# Patient Record
Sex: FEMALE | Race: WHITE | NOT HISPANIC OR LATINO | Employment: UNEMPLOYED
[De-identification: names, ages, dates, MRNs, and addresses within clinical notes are randomized per-mention and may not be internally consistent; named-entity substitution may affect disease eponyms.]

---

## 2017-06-24 ENCOUNTER — HEALTH MAINTENANCE LETTER (OUTPATIENT)
Age: 57
End: 2017-06-24

## 2018-02-03 ENCOUNTER — HOSPITAL ENCOUNTER (EMERGENCY)
Facility: CLINIC | Age: 58
Discharge: HOME | End: 2018-02-03
Attending: INTERNAL MEDICINE | Admitting: INTERNAL MEDICINE
Payer: COMMERCIAL

## 2018-02-03 VITALS
SYSTOLIC BLOOD PRESSURE: 160 MMHG | RESPIRATION RATE: 18 BRPM | TEMPERATURE: 96.4 F | HEART RATE: 97 BPM | OXYGEN SATURATION: 97 % | WEIGHT: 131.44 LBS | DIASTOLIC BLOOD PRESSURE: 90 MMHG | BODY MASS INDEX: 21.21 KG/M2

## 2018-02-03 DIAGNOSIS — H66.001 ACUTE SUPPURATIVE OTITIS MEDIA OF RIGHT EAR WITHOUT SPONTANEOUS RUPTURE OF TYMPANIC MEMBRANE, RECURRENCE NOT SPECIFIED: ICD-10-CM

## 2018-02-03 PROCEDURE — 99284 EMERGENCY DEPT VISIT MOD MDM: CPT | Mod: GC | Performed by: INTERNAL MEDICINE

## 2018-02-03 PROCEDURE — 99282 EMERGENCY DEPT VISIT SF MDM: CPT | Performed by: INTERNAL MEDICINE

## 2018-02-03 RX ORDER — AMOXICILLIN 500 MG/1
1000 CAPSULE ORAL 2 TIMES DAILY
Qty: 40 CAPSULE | Refills: 0 | Status: SHIPPED | OUTPATIENT
Start: 2018-02-03 | End: 2018-02-13

## 2018-02-03 RX ORDER — PSEUDOEPHEDRINE HCL 30 MG
30 TABLET ORAL EVERY 4 HOURS PRN
Qty: 12 TABLET | Refills: 0 | Status: SHIPPED | OUTPATIENT
Start: 2018-02-03 | End: 2018-02-06

## 2018-02-03 ASSESSMENT — ENCOUNTER SYMPTOMS
SINUS PAIN: 1
NUMBNESS: 0
LIGHT-HEADEDNESS: 0
RHINORRHEA: 1
FEVER: 0
ABDOMINAL PAIN: 0
COUGH: 0
CHILLS: 0
SHORTNESS OF BREATH: 0
WEAKNESS: 0
HEADACHES: 0
BACK PAIN: 1
DIFFICULTY URINATING: 0
CONFUSION: 0

## 2018-02-03 NOTE — ED AVS SNAPSHOT
[facility], [place], Emergency Department    [address]    [facility] [address]    Phone:  [phone]    Fax:  [phone]                                       Linda Guadalupe   MRN: [id number]    Department:  [facility], Emergency Department   Date of Visit:  2/3/2018           After Visit Summary Signature Page     I have received my discharge instructions, and my questions have been answered. I have discussed any challenges I see with this plan with the nurse or doctor.    ..........................................................................................................................................  Patient/Patient Representative Signature      ..........................................................................................................................................  Patient Representative Print Name and Relationship to Patient    ..................................................               ................................................  Date                                            Time    ..........................................................................................................................................  Reviewed by Signature/Title    ...................................................              ..............................................  Date                                                            Time

## 2018-02-03 NOTE — ED AVS SNAPSHOT
[facility], Emergency Department    [address]    [facility] [place] [address]    Phone:  [phone]    Fax:  [phone]                                       Linda Guadalupe   MRN: [id number]    Department:  [facility], Emergency Department   Date of Visit:  2/3/2018           Patient Information     Date Of Birth          1960        Your diagnoses for this visit were:     Acute suppurative otitis media of right ear without spontaneous rupture of tympanic membrane, recurrence not specified        You were seen by Shawn Banegas MD.        Discharge Instructions         Please make an appointment to follow up with Your Primary Care Provider in 3-5 days unless symptoms completely resolve.    Middle Ear Infection (Adult)  You have an infection of the middle ear, the space behind the eardrum. This is also called acute otitis media (AOM). Sometimes it is caused by the common cold. This is because congestion can block the internal passage (eustachian tube) that drains fluid from the middle ear. When the middle ear fills with fluid, bacteria can grow there and cause an infection. Oral antibiotics are used to treat this illness, not ear drops. Symptoms usually start to improve within 1 to 2 days of treatment.    Home care  The following are general care guidelines:    Finish all of the antibiotic medicine given, even though you may feel better after the first few days.    You may use over-the-counter medicine, such as acetaminophen or ibuprofen, to control pain and fever, unless something else was prescribed. If you have chronic liver or kidney disease or have ever had a stomach ulcer or gastrointestinal bleeding, talk with your healthcare provider before using these medicines. Do not give aspirin to anyone under 18 years of age who has a fever. It may cause severe illness or death.  Follow-up care  Follow up with your healthcare provider, or as advised, in 2 weeks if all symptoms have not gotten  better, or if hearing doesn't go back to normal within 1 month.  When to seek medical advice  Call your healthcare provider right away if any of these occur:    Ear pain gets worse or does not improve after 3 days of treatment    Unusual drowsiness or confusion    Neck pain, stiff neck, or headache    Fluid or blood draining from the ear canal    Fever of 100.4 F (38 C) or as advised     Seizure  Date Last Reviewed: 6/1/2016 2000-2017 [employer]. [address] [place] [address]. All rights reserved. This information is not intended as a substitute for professional medical care. Always follow your healthcare professional's instructions.          24 Hour Appointment Hotline       To make an appointment at any [facility] (formerly [facility]), call [phone] ([phone]). If you don't have a family doctor or clinic, we will help you find one. [facility] [place] clinics are conveniently located to serve the needs of you and your family.             Review of your medicines      START taking        Dose / Directions Last dose taken    amoxicillin 500 MG capsule   Commonly known as:  AMOXIL   Dose:  1000 mg   Quantity:  40 capsule        Take 2 capsules (1,000 mg) by mouth 2 times daily for 10 days   Refills:  0        pseudoePHEDrine 30 MG tablet   Commonly known as:  SUDAFED   Dose:  30 mg   Quantity:  12 tablet        Take 1 tablet (30 mg) by mouth every 4 hours as needed for congestion   Refills:  0          Our records show that you are taking the medicines listed below. If these are incorrect, please call your family doctor or clinic.        Dose / Directions Last dose taken    gabapentin 300 MG capsule   Commonly known as:  NEURONTIN   Quantity:  90 capsule        Take 1 tablet (300 mg) tonight , then 1 tablet twice daily for  3 days, then 1 tablet three times daily   Refills:  1        ibuprofen 800 MG tablet   Commonly known as:  ADVIL/MOTRIN   Dose:  800 mg   Quantity:  90 tablet        Take 1 tablet  (800 mg) by mouth every 8 hours as needed for pain   Refills:  1        oxyCODONE-acetaminophen 5-325 MG per tablet   Commonly known as:  PERCOCET   Dose:  1-2 tablet   Quantity:  16 tablet        Take 1-2 tablets by mouth every 6 hours as needed for moderate to severe pain   Refills:  0                Prescriptions were sent or printed at these locations (2 Prescriptions)                   Other Prescriptions                Printed at Department/Unit printer (2 of 2)         amoxicillin (AMOXIL) 500 MG capsule               pseudoePHEDrine (SUDAFED) 30 MG tablet                Orders Needing Specimen Collection     None      Pending Results     No orders found from 2/1/2018 to 2/4/2018.            Pending Culture Results     No orders found from 2/1/2018 to 2/4/2018.            Pending Results Instructions     If you had any lab results that were not finalized at the time of your Discharge, you can call the ED Lab Result RN at [phone]. You will be contacted by this team for any positive Lab results or changes in treatment. The nurses are available 7 days a week from 10A to 6:30P.  You can leave a message 24 hours per day and they will return your call.        Thank you for choosing [facility]       Thank you for choosing [facility] for your care. Our goal is always to provide you with excellent care. Hearing back from our patients is one way we can continue to improve our services. Please take a few minutes to complete the written survey that you may receive in the mail after you visit with us. Thank you!        [employer] Information     [employer] gives you secure access to your electronic health record. If you see a primary care provider, you can also send messages to your care team and make appointments. If you have questions, please call your primary care clinic.  If you do not have a primary care provider, please call [phone] and they will assist you.        Care EveryWhere ID     This is your Care EveryWhere  ID. This could be used by other organizations to access your [facility] medical records  [id number]        Equal Access to Services     TATIANA GERMAN : La Nena Kelley, hair [place]field, maicol dennis. [facility] [phone].    ATENCIÓN: Si habla español, tiene a walden disposición servicios gratuitos de asistencia lingüística. Llame al [phone].    We comply with applicable federal civil rights laws and [place] laws. We do not discriminate on the basis of race, color, national origin, age, disability, sex, sexual orientation, or gender identity.            After Visit Summary       This is your record. Keep this with you and show to your community pharmacist(s) and doctor(s) at your next visit.

## 2018-02-04 NOTE — ED PROVIDER NOTES
History     Chief Complaint   Patient presents with     Otalgia     Has pain and ringing in right ear.  Symptoms for 3 days. Has URI symptoms of cough, runny nose, sinus pain.for similar length of time.     BENNY Guadalupe is a 57 year old female who presents with chief complaint of right ear pain.  Patient reports having cold symptoms such as cough, runny nose and sinus congestion starting about 3-4 days ago.  For about 2-3 days patient has been having throbbing right ear pain causing difficulty with sleep.  Patient denies fevers, chills, shortness of breath.  She has not had any drainage outside of her ear.  Patient is a smoker.  She reports taking over-the-counter Tylenol and ibuprofen with minimal relief.    I have reviewed the Medications, Allergies, Past Medical and Surgical History, and Social History in the Epic system.    Review of Systems   Constitutional: Negative for chills and fever.   HENT: Positive for congestion, ear pain, rhinorrhea, sinus pain and tinnitus. Negative for ear discharge.    Respiratory: Negative for cough and shortness of breath.    Cardiovascular: Negative for chest pain.   Gastrointestinal: Negative for abdominal pain.   Genitourinary: Negative for difficulty urinating.   Musculoskeletal: Positive for back pain.   Neurological: Negative for weakness, light-headedness, numbness and headaches.   Psychiatric/Behavioral: Negative for confusion.       Physical Exam   BP: 160/90  Pulse: 97  Temp: 96.4  F (35.8  C)  Resp: 18  Weight: 59.6 kg (131 lb 7 oz)  SpO2: 97 %      Physical Exam   Constitutional: She is oriented to person, place, and time. No distress.   HENT:   Head: Atraumatic.   Right Ear: External ear and ear canal normal. Tympanic membrane is erythematous. Tympanic membrane is not injected and not bulging. No middle ear effusion. Decreased hearing is noted.   Left Ear: Tympanic membrane normal.   Ears:    Mouth/Throat: Oropharynx is clear and moist.   Eyes: Pupils are  equal, round, and reactive to light. No scleral icterus.   Neck: Neck supple.   Cardiovascular: Normal rate, regular rhythm, normal heart sounds and intact distal pulses.    No murmur heard.  Pulmonary/Chest: Effort normal and breath sounds normal. No respiratory distress. She has no wheezes. She has no rales.   Coarse breath sounds   Abdominal: Soft. Bowel sounds are normal. There is no tenderness.   Musculoskeletal: She exhibits no edema or tenderness.   Lymphadenopathy:     She has no cervical adenopathy.   Neurological: She is alert and oriented to person, place, and time. No cranial nerve deficit.   Skin: Skin is warm. No rash noted. She is not diaphoretic.   Psychiatric: She has a normal mood and affect. Her behavior is normal.   Vitals reviewed.      ED Course     ED Course     Procedures            Critical Care time:             Labs Ordered and Resulted from Time of ED Arrival Up to the Time of Departure from the ED - No data to display         Assessments & Plan (with Medical Decision Making)   Patient is a 57-year-old female who presents with chief complaint of right ear pain for 2-3 days duration.  Patient also has accompanying tinnitus.  Physical exam shows a bulging and erythematous tympanic membrane on the right side, the left ear being normal.  Will treat patient with amoxicillin and pseudoephedrine for decongestion.  Recommend patient follow-up with her PCP in 5 days if she is not starting to improve.    I have reviewed the nursing notes.    I have reviewed the findings, diagnosis, plan and need for follow up with the patient.  This data collected with the Resident working in the Emergency Department.  Patient was seen and evaluated by myself and I repeated the history and physical exam with the patient.  The plan of care was discussed with them.  The key portions of the note including the entire assessment and plan reflect my documentation.--Maliatakaley        Discharge Medication List as of  2/3/2018  8:32 PM      START taking these medications    Details   amoxicillin (AMOXIL) 500 MG capsule Take 2 capsules (1,000 mg) by mouth 2 times daily for 10 days, Disp-40 capsule, R-0, Local Print      pseudoePHEDrine (SUDAFED) 30 MG tablet Take 1 tablet (30 mg) by mouth every 4 hours as needed for congestion, Disp-12 tablet, R-0, Local Print             Final diagnoses:   Acute suppurative otitis media of right ear without spontaneous rupture of tympanic membrane, recurrence not specified     Jac Nickerson D.O.  Family Medicine   [phone]  2/3/2018   [facility], [place], EMERGENCY DEPARTMENT     Shawn Banegas MD  02/03/18 1307

## 2018-02-04 NOTE — DISCHARGE INSTRUCTIONS
Please make an appointment to follow up with Your Primary Care Provider in 3-5 days unless symptoms completely resolve.    Middle Ear Infection (Adult)  You have an infection of the middle ear, the space behind the eardrum. This is also called acute otitis media (AOM). Sometimes it is caused by the common cold. This is because congestion can block the internal passage (eustachian tube) that drains fluid from the middle ear. When the middle ear fills with fluid, bacteria can grow there and cause an infection. Oral antibiotics are used to treat this illness, not ear drops. Symptoms usually start to improve within 1 to 2 days of treatment.    Home care  The following are general care guidelines:    Finish all of the antibiotic medicine given, even though you may feel better after the first few days.    You may use over-the-counter medicine, such as acetaminophen or ibuprofen, to control pain and fever, unless something else was prescribed. If you have chronic liver or kidney disease or have ever had a stomach ulcer or gastrointestinal bleeding, talk with your healthcare provider before using these medicines. Do not give aspirin to anyone under 18 years of age who has a fever. It may cause severe illness or death.  Follow-up care  Follow up with your healthcare provider, or as advised, in 2 weeks if all symptoms have not gotten better, or if hearing doesn't go back to normal within 1 month.  When to seek medical advice  Call your healthcare provider right away if any of these occur:    Ear pain gets worse or does not improve after 3 days of treatment    Unusual drowsiness or confusion    Neck pain, stiff neck, or headache    Fluid or blood draining from the ear canal    Fever of 100.4 F (38 C) or as advised     Seizure  Date Last Reviewed: 6/1/2016 2000-2017 [employer]. [address] [place] [address]. All rights reserved. This information is not intended as a substitute for professional  medical care. Always follow your healthcare professional's instructions.

## 2018-05-07 ENCOUNTER — MEDICAL CORRESPONDENCE (OUTPATIENT)
Dept: HEALTH INFORMATION MANAGEMENT | Facility: CLINIC | Age: 58
End: 2018-05-07

## 2018-09-04 ENCOUNTER — RECORDS - HEALTHEAST (OUTPATIENT)
Dept: ADMINISTRATIVE | Facility: OTHER | Age: 58
End: 2018-09-04

## 2018-09-17 ENCOUNTER — RECORDS - HEALTHEAST (OUTPATIENT)
Dept: ADMINISTRATIVE | Facility: OTHER | Age: 58
End: 2018-09-17

## 2018-10-01 ENCOUNTER — RECORDS - HEALTHEAST (OUTPATIENT)
Dept: ADMINISTRATIVE | Facility: OTHER | Age: 58
End: 2018-10-01

## 2018-10-29 ENCOUNTER — RECORDS - HEALTHEAST (OUTPATIENT)
Dept: ADMINISTRATIVE | Facility: OTHER | Age: 58
End: 2018-10-29

## 2018-12-10 ENCOUNTER — RECORDS - HEALTHEAST (OUTPATIENT)
Dept: ADMINISTRATIVE | Facility: OTHER | Age: 58
End: 2018-12-10

## 2019-10-02 ENCOUNTER — HEALTH MAINTENANCE LETTER (OUTPATIENT)
Age: 59
End: 2019-10-02

## 2020-02-24 ENCOUNTER — RECORDS - HEALTHEAST (OUTPATIENT)
Dept: ADMINISTRATIVE | Facility: OTHER | Age: 60
End: 2020-02-24

## 2021-01-15 ENCOUNTER — HEALTH MAINTENANCE LETTER (OUTPATIENT)
Age: 61
End: 2021-01-15

## 2021-09-04 ENCOUNTER — HEALTH MAINTENANCE LETTER (OUTPATIENT)
Age: 61
End: 2021-09-04

## 2021-10-30 ENCOUNTER — HEALTH MAINTENANCE LETTER (OUTPATIENT)
Age: 61
End: 2021-10-30

## 2022-02-19 ENCOUNTER — HEALTH MAINTENANCE LETTER (OUTPATIENT)
Age: 62
End: 2022-02-19

## 2022-10-22 ENCOUNTER — HEALTH MAINTENANCE LETTER (OUTPATIENT)
Age: 62
End: 2022-10-22

## 2023-01-20 ENCOUNTER — APPOINTMENT (OUTPATIENT)
Dept: GENERAL RADIOLOGY | Facility: CLINIC | Age: 63
End: 2023-01-20
Attending: EMERGENCY MEDICINE
Payer: COMMERCIAL

## 2023-01-20 ENCOUNTER — HOSPITAL ENCOUNTER (INPATIENT)
Facility: CLINIC | Age: 63
LOS: 4 days | Discharge: HOME | End: 2023-01-24
Attending: EMERGENCY MEDICINE | Admitting: INTERNAL MEDICINE
Payer: COMMERCIAL

## 2023-01-20 DIAGNOSIS — I16.0 HYPERTENSIVE URGENCY: ICD-10-CM

## 2023-01-20 DIAGNOSIS — I21.4 NSTEMI (NON-ST ELEVATED MYOCARDIAL INFARCTION) (H): Primary | ICD-10-CM

## 2023-01-20 PROBLEM — G62.9 NEUROPATHY: Status: ACTIVE | Noted: 2023-01-20

## 2023-01-20 LAB
ALBUMIN SERPL BCG-MCNC: 5.2 G/DL (ref 3.5–5.2)
ALP SERPL-CCNC: 88 U/L (ref 35–104)
ALT SERPL W P-5'-P-CCNC: 22 U/L (ref 10–35)
ANION GAP SERPL CALCULATED.3IONS-SCNC: 13 MMOL/L (ref 7–15)
AST SERPL W P-5'-P-CCNC: 32 U/L (ref 10–35)
ATRIAL RATE - MUSE: 127 BPM
BASOPHILS # BLD AUTO: 0.1 10E3/UL (ref 0–0.2)
BASOPHILS NFR BLD AUTO: 1 %
BILIRUB SERPL-MCNC: 0.6 MG/DL
BUN SERPL-MCNC: 8.3 MG/DL (ref 8–23)
CALCIUM SERPL-MCNC: 9.9 MG/DL (ref 8.8–10.2)
CHLORIDE SERPL-SCNC: 99 MMOL/L (ref 98–107)
CREAT SERPL-MCNC: 0.81 MG/DL (ref 0.51–0.95)
D DIMER PPP FEU-MCNC: <0.27 UG/ML FEU (ref 0–0.5)
DEPRECATED HCO3 PLAS-SCNC: 24 MMOL/L (ref 22–29)
DIASTOLIC BLOOD PRESSURE - MUSE: NORMAL MMHG
EOSINOPHIL # BLD AUTO: 0.1 10E3/UL (ref 0–0.7)
EOSINOPHIL NFR BLD AUTO: 2 %
ERYTHROCYTE [DISTWIDTH] IN BLOOD BY AUTOMATED COUNT: 12.4 % (ref 10–15)
ETHANOL SERPL-MCNC: <0.01 G/DL
GFR SERPL CREATININE-BSD FRML MDRD: 83 ML/MIN/1.73M2
GLUCOSE SERPL-MCNC: 171 MG/DL (ref 70–99)
HCT VFR BLD AUTO: 47.2 % (ref 35–47)
HGB BLD-MCNC: 16 G/DL (ref 11.7–15.7)
IMM GRANULOCYTES # BLD: 0 10E3/UL
IMM GRANULOCYTES NFR BLD: 0 %
INTERPRETATION ECG - MUSE: NORMAL
LYMPHOCYTES # BLD AUTO: 2.2 10E3/UL (ref 0.8–5.3)
LYMPHOCYTES NFR BLD AUTO: 25 %
MCH RBC QN AUTO: 31.6 PG (ref 26.5–33)
MCHC RBC AUTO-ENTMCNC: 33.9 G/DL (ref 31.5–36.5)
MCV RBC AUTO: 93 FL (ref 78–100)
MONOCYTES # BLD AUTO: 0.6 10E3/UL (ref 0–1.3)
MONOCYTES NFR BLD AUTO: 7 %
NEUTROPHILS # BLD AUTO: 5.5 10E3/UL (ref 1.6–8.3)
NEUTROPHILS NFR BLD AUTO: 65 %
NRBC # BLD AUTO: 0 10E3/UL
NRBC BLD AUTO-RTO: 0 /100
P AXIS - MUSE: 86 DEGREES
PLATELET # BLD AUTO: 396 10E3/UL (ref 150–450)
POTASSIUM SERPL-SCNC: 4.7 MMOL/L (ref 3.4–5.3)
PR INTERVAL - MUSE: 152 MS
PROT SERPL-MCNC: 7.8 G/DL (ref 6.4–8.3)
QRS DURATION - MUSE: 94 MS
QT - MUSE: 310 MS
QTC - MUSE: 450 MS
R AXIS - MUSE: -65 DEGREES
RBC # BLD AUTO: 5.06 10E6/UL (ref 3.8–5.2)
SARS-COV-2 RNA RESP QL NAA+PROBE: NEGATIVE
SODIUM SERPL-SCNC: 136 MMOL/L (ref 136–145)
SYSTOLIC BLOOD PRESSURE - MUSE: NORMAL MMHG
T AXIS - MUSE: 63 DEGREES
TROPONIN T SERPL HS-MCNC: 10 NG/L
TROPONIN T SERPL HS-MCNC: 105 NG/L
TROPONIN T SERPL HS-MCNC: 114 NG/L
TROPONIN T SERPL HS-MCNC: 80 NG/L
UFH PPP CHRO-ACNC: 0.1 IU/ML
VENTRICULAR RATE- MUSE: 127 BPM
WBC # BLD AUTO: 8.6 10E3/UL (ref 4–11)

## 2023-01-20 PROCEDURE — U0003 INFECTIOUS AGENT DETECTION BY NUCLEIC ACID (DNA OR RNA); SEVERE ACUTE RESPIRATORY SYNDROME CORONAVIRUS 2 (SARS-COV-2) (CORONAVIRUS DISEASE [COVID-19]), AMPLIFIED PROBE TECHNIQUE, MAKING USE OF HIGH THROUGHPUT TECHNOLOGIES AS DESCRIBED BY CMS-2020-01-R: HCPCS | Performed by: INTERNAL MEDICINE

## 2023-01-20 PROCEDURE — 80053 COMPREHEN METABOLIC PANEL: CPT | Performed by: EMERGENCY MEDICINE

## 2023-01-20 PROCEDURE — 250N000013 HC RX MED GY IP 250 OP 250 PS 637: Performed by: INTERNAL MEDICINE

## 2023-01-20 PROCEDURE — 80061 LIPID PANEL: CPT | Performed by: INTERNAL MEDICINE

## 2023-01-20 PROCEDURE — C9803 HOPD COVID-19 SPEC COLLECT: HCPCS

## 2023-01-20 PROCEDURE — 84484 ASSAY OF TROPONIN QUANT: CPT | Performed by: INTERNAL MEDICINE

## 2023-01-20 PROCEDURE — 84484 ASSAY OF TROPONIN QUANT: CPT | Performed by: EMERGENCY MEDICINE

## 2023-01-20 PROCEDURE — 210N000002 HC R&B HEART CARE

## 2023-01-20 PROCEDURE — 83036 HEMOGLOBIN GLYCOSYLATED A1C: CPT | Performed by: INTERNAL MEDICINE

## 2023-01-20 PROCEDURE — 250N000013 HC RX MED GY IP 250 OP 250 PS 637: Performed by: PHYSICIAN ASSISTANT

## 2023-01-20 PROCEDURE — 250N000011 HC RX IP 250 OP 636: Performed by: EMERGENCY MEDICINE

## 2023-01-20 PROCEDURE — 250N000009 HC RX 250: Performed by: INTERNAL MEDICINE

## 2023-01-20 PROCEDURE — 99223 1ST HOSP IP/OBS HIGH 75: CPT | Mod: AI | Performed by: INTERNAL MEDICINE

## 2023-01-20 PROCEDURE — 99285 EMERGENCY DEPT VISIT HI MDM: CPT | Mod: 25

## 2023-01-20 PROCEDURE — 36415 COLL VENOUS BLD VENIPUNCTURE: CPT | Performed by: EMERGENCY MEDICINE

## 2023-01-20 PROCEDURE — 82077 ASSAY SPEC XCP UR&BREATH IA: CPT | Performed by: EMERGENCY MEDICINE

## 2023-01-20 PROCEDURE — 250N000013 HC RX MED GY IP 250 OP 250 PS 637: Performed by: EMERGENCY MEDICINE

## 2023-01-20 PROCEDURE — 93005 ELECTROCARDIOGRAM TRACING: CPT

## 2023-01-20 PROCEDURE — 99223 1ST HOSP IP/OBS HIGH 75: CPT | Performed by: INTERNAL MEDICINE

## 2023-01-20 PROCEDURE — 85520 HEPARIN ASSAY: CPT | Performed by: EMERGENCY MEDICINE

## 2023-01-20 PROCEDURE — 85379 FIBRIN DEGRADATION QUANT: CPT | Performed by: EMERGENCY MEDICINE

## 2023-01-20 PROCEDURE — 36415 COLL VENOUS BLD VENIPUNCTURE: CPT | Performed by: INTERNAL MEDICINE

## 2023-01-20 PROCEDURE — 85025 COMPLETE CBC W/AUTO DIFF WBC: CPT | Performed by: EMERGENCY MEDICINE

## 2023-01-20 PROCEDURE — 71046 X-RAY EXAM CHEST 2 VIEWS: CPT

## 2023-01-20 RX ORDER — ALBUTEROL SULFATE 90 UG/1
1-2 AEROSOL, METERED RESPIRATORY (INHALATION) EVERY 4 HOURS PRN
COMMUNITY

## 2023-01-20 RX ORDER — OXYCODONE AND ACETAMINOPHEN 5; 325 MG/1; MG/1
1 TABLET ORAL EVERY 6 HOURS PRN
Status: DISCONTINUED | OUTPATIENT
Start: 2023-01-20 | End: 2023-01-21

## 2023-01-20 RX ORDER — ACETAMINOPHEN 325 MG/1
650 TABLET ORAL EVERY 6 HOURS PRN
Status: DISCONTINUED | OUTPATIENT
Start: 2023-01-20 | End: 2023-01-24 | Stop reason: HOSPADM

## 2023-01-20 RX ORDER — NALOXONE HYDROCHLORIDE 0.4 MG/ML
0.4 INJECTION, SOLUTION INTRAMUSCULAR; INTRAVENOUS; SUBCUTANEOUS
Status: DISCONTINUED | OUTPATIENT
Start: 2023-01-20 | End: 2023-01-24 | Stop reason: HOSPADM

## 2023-01-20 RX ORDER — ASPIRIN 81 MG/1
162 TABLET, CHEWABLE ORAL ONCE
Status: DISCONTINUED | OUTPATIENT
Start: 2023-01-20 | End: 2023-01-20

## 2023-01-20 RX ORDER — HEPARIN SODIUM 10000 [USP'U]/100ML
0-5000 INJECTION, SOLUTION INTRAVENOUS CONTINUOUS
Status: DISCONTINUED | OUTPATIENT
Start: 2023-01-20 | End: 2023-01-23

## 2023-01-20 RX ORDER — NITROGLYCERIN 0.4 MG/1
0.4 TABLET SUBLINGUAL EVERY 5 MIN PRN
Status: DISCONTINUED | OUTPATIENT
Start: 2023-01-20 | End: 2023-01-23

## 2023-01-20 RX ORDER — METOPROLOL TARTRATE 1 MG/ML
5 INJECTION, SOLUTION INTRAVENOUS ONCE
Status: COMPLETED | OUTPATIENT
Start: 2023-01-20 | End: 2023-01-20

## 2023-01-20 RX ORDER — METOPROLOL TARTRATE 25 MG/1
25 TABLET, FILM COATED ORAL 2 TIMES DAILY
Status: DISCONTINUED | OUTPATIENT
Start: 2023-01-20 | End: 2023-01-22

## 2023-01-20 RX ORDER — NALOXONE HYDROCHLORIDE 0.4 MG/ML
0.2 INJECTION, SOLUTION INTRAMUSCULAR; INTRAVENOUS; SUBCUTANEOUS
Status: DISCONTINUED | OUTPATIENT
Start: 2023-01-20 | End: 2023-01-24 | Stop reason: HOSPADM

## 2023-01-20 RX ORDER — MORPHINE SULFATE 2 MG/ML
2 INJECTION, SOLUTION INTRAMUSCULAR; INTRAVENOUS
Status: DISCONTINUED | OUTPATIENT
Start: 2023-01-20 | End: 2023-01-21

## 2023-01-20 RX ORDER — MAGNESIUM HYDROXIDE/ALUMINUM HYDROXICE/SIMETHICONE 120; 1200; 1200 MG/30ML; MG/30ML; MG/30ML
30 SUSPENSION ORAL EVERY 4 HOURS PRN
Status: DISCONTINUED | OUTPATIENT
Start: 2023-01-20 | End: 2023-01-24 | Stop reason: HOSPADM

## 2023-01-20 RX ORDER — ALBUTEROL SULFATE 90 UG/1
1-2 AEROSOL, METERED RESPIRATORY (INHALATION) EVERY 4 HOURS PRN
Status: DISCONTINUED | OUTPATIENT
Start: 2023-01-20 | End: 2023-01-24 | Stop reason: HOSPADM

## 2023-01-20 RX ORDER — ACETAMINOPHEN 650 MG/1
650 SUPPOSITORY RECTAL EVERY 6 HOURS PRN
Status: DISCONTINUED | OUTPATIENT
Start: 2023-01-20 | End: 2023-01-24 | Stop reason: HOSPADM

## 2023-01-20 RX ORDER — ASPIRIN 81 MG/1
324 TABLET, CHEWABLE ORAL ONCE
Status: COMPLETED | OUTPATIENT
Start: 2023-01-20 | End: 2023-01-20

## 2023-01-20 RX ORDER — LORAZEPAM 1 MG/1
1 TABLET ORAL ONCE
Status: COMPLETED | OUTPATIENT
Start: 2023-01-20 | End: 2023-01-20

## 2023-01-20 RX ORDER — MORPHINE SULFATE 2 MG/ML
1 INJECTION, SOLUTION INTRAMUSCULAR; INTRAVENOUS
Status: DISCONTINUED | OUTPATIENT
Start: 2023-01-20 | End: 2023-01-21

## 2023-01-20 RX ORDER — ASPIRIN 81 MG/1
81 TABLET ORAL DAILY
Status: DISCONTINUED | OUTPATIENT
Start: 2023-01-21 | End: 2023-01-24 | Stop reason: HOSPADM

## 2023-01-20 RX ORDER — LIDOCAINE 40 MG/G
CREAM TOPICAL
Status: DISCONTINUED | OUTPATIENT
Start: 2023-01-20 | End: 2023-01-24 | Stop reason: HOSPADM

## 2023-01-20 RX ADMIN — MELATONIN 5 MG TABLET 5 MG: at 22:29

## 2023-01-20 RX ADMIN — HEPARIN SODIUM 600 UNITS/HR: 10000 INJECTION, SOLUTION INTRAVENOUS at 15:57

## 2023-01-20 RX ADMIN — LORAZEPAM 1 MG: 1 TABLET ORAL at 10:52

## 2023-01-20 RX ADMIN — METOPROLOL TARTRATE 25 MG: 25 TABLET, FILM COATED ORAL at 22:28

## 2023-01-20 RX ADMIN — METOPROLOL TARTRATE 5 MG: 5 INJECTION INTRAVENOUS at 15:49

## 2023-01-20 RX ADMIN — OXYCODONE HYDROCHLORIDE AND ACETAMINOPHEN 1 TABLET: 5; 325 TABLET ORAL at 18:31

## 2023-01-20 RX ADMIN — ASPIRIN 81 MG CHEWABLE TABLET 324 MG: 81 TABLET CHEWABLE at 15:49

## 2023-01-20 ASSESSMENT — ACTIVITIES OF DAILY LIVING (ADL)
ADLS_ACUITY_SCORE: 35

## 2023-01-20 NOTE — H&P
"[facility]    History and Physical - Hospitalist Service       Date of Admission:  1/20/2023    Assessment & Plan      Linda Guadalupe is a 62 year old female with history of tobacco use (smokes half pack of cigarettes per day) who presents with uneasy feeling, also an episode of chest pain.    Principal Problem:    NSTEMI (non-ST elevated myocardial infarction) (H)    Admit as inpatient    Given aspirin in ED; treat with low intensity heparin drip    Repeat troponin T in 2 hours x 2.    Cardiology consult requested, I discussed with Dr. Garcia and appreciate her evaluation recommendations    NPO at midnight, per Cardiology    Treat tachycardia and hypertension, see below    Check lipid profile  Active Problems:    Hypertensive urgency    Treated with IV beta-blocker    Tobacco use disorder    Smoking cessation counseling     Diet:   Regular, no caffeine  DVT Prophylaxis: Heparin   Coles Catheter: Not present  Lines: None     Cardiac Monitoring: None  Code Status:  Full    Clinically Significant Risk Factors Present on Admission                             Disposition Plan      Expected Discharge Date: 01/22/2023                  Vanessa Herrera MD  Hospitalist Service  [facility]  Securely message with [employer] (more info)  Text page via [employer] Paging/Directory     ______________________________________________________________________    Chief Complaint   \"I felt 'off\" all last week.\"      History of Present Illness   Linda Guadalupe is a 62 year old female with history of tobacco abuse, smokes half pack of cigarettes per day, who presents to the emergency department reporting tremor of both hands, insomnia, and an episode of chest discomfort.    Linda says she felt \"off\" all of last week.  She said she had a tremor involving the left hand, found it difficult to apply her mascara because her hands were shaking.  She says she felt as if a \"spirit\" or maybe \"an tommy\" " "was hovering beside her all week \"helping me do my work, guiding me.\"  She slept very little last night.    This morning, she had an episode of central chest tightness which she rates 10 out of 10 in intensity.  She leaned against the counter at home and the pain resolved spontaneously.  Pain did not radiate to the neck, back, arm or jaw. She did not perspire, was not nauseated, did not vomit.  She asked a family member to bring her to the emergency department for evaluation.    In the triage area, she reported feeling panicked, as if she could not breathe.  She reported feeling anxious and overwhelmed.  She was treated with Ativan 1 mg IV and overall feels much better.  She has no chest discomfort at this time.  The first troponin value was normal, second was elevated.  Initial EKG was a poor quality study due to significant artifact.  EKG was repeated, this shows normal sinus rhythm with tall, upright R waves in the anterior precordial leads, left anterior fascicular block and ST segment changes in V1 through V3.  Patient is admitted for further evaluation treatment of non-STEMI.      Past Medical History    Past Medical History:   Diagnosis Date     Tobacco abuse 11/13/2012       Past Surgical History   Past Surgical History:   Procedure Laterality Date     ORTHOPEDIC SURGERY  1996    R shoulder       Prior to Admission Medications   Prior to Admission Medications   Prescriptions Last Dose Informant Patient Reported? Taking?   albuterol (PROAIR HFA/PROVENTIL HFA/VENTOLIN HFA) 108 (90 Base) MCG/ACT inhaler  at prn  Yes Yes   Sig: Inhale 1-2 puffs into the lungs every 4 hours as needed for shortness of breath or wheezing   ibuprofen (ADVIL,MOTRIN) 800 MG tablet  at prn  No Yes   Sig: Take 1 tablet (800 mg) by mouth every 8 hours as needed for pain   Patient taking differently: Take 600 mg by mouth every 8 hours as needed for pain   oxyCODONE-acetaminophen (PERCOCET) 5-325 MG per tablet  at prn  No Yes   Sig: Take " 1-2 tablets by mouth every 6 hours as needed for moderate to severe pain      Facility-Administered Medications: None        Family History   I have reviewed this patient's family history and updated it with pertinent information if needed.   Father  at age 49 of heart disease.    Physical Exam   Vital Signs: Temp: 97.9  F (36.6  C) Temp src: Oral BP: (!) 149/101 Pulse: 110   Resp: 27 SpO2: 98 % O2 Device: None (Room air)    Weight: 110 lbs 9.6 oz    General Appearance: Awake, alert, looks comfortable  Respiratory: Lungs are clear to auscultation  Cardiovascular: Heart is tachycardic, regular rate and rhythm  GI: Abdomen is soft, nontender, there are bowel sounds present  Skin: No rash on exposed skin  Other: Mood is very pleasant    Medical Decision Making       75 MINUTES SPENT BY ME on the date of service doing chart review, history, exam, documentation & further activities per the note.      Data     I have personally reviewed the following data over the past 24 hrs:    8.6  \   16.0 (H)   / 396     136 99 8.3 /  171 (H)   4.7 24 0.81 \       ALT: 22 AST: 32 AP: 88 TBILI: 0.6   ALB: 5.2 TOT PROTEIN: 7.8 LIPASE: N/A       Trop: 80 (H) BNP: N/A       INR:  N/A PTT:  N/A   D-dimer:  <0.27 Fibrinogen:  N/A       Imaging results reviewed over the past 24 hrs:   Recent Results (from the past 24 hour(s))   XR Chest 2 Views    Narrative    CHEST TWO VIEWS 2023 11:02 AM     HISTORY: Shortness of breath.    COMPARISON: May 11, 2016       Impression    IMPRESSION: There are no acute infiltrates. The cardiac silhouette is  not enlarged. Pulmonary vasculature is unremarkable.    CLOTILDE LOO MD         SYSTEM ID:  [id number]

## 2023-01-20 NOTE — Clinical Note
Stent deployed in the proximal left anterior descending. Max pressure = 11 jaguar. Total duration = 25 seconds.

## 2023-01-20 NOTE — CONSULTS
[facility]    Cardiology Consultation     Date of Admission:  1/20/2023    Assessment & Plan   Linda Guadalupe is a 62 year old female who was admitted on 1/20/2023.    1. NSTEMI. HS trop T 10 -->80  2. Severe hypertension  3. Tobacco use disorder, considering quitting.   4. Erythrocytosis, suspect due to smoking  5. Chronic back pain    Patient being treated presumptively for non-STEMI but mildly elevated troponin may be due to severe hypertension.  She is currently receiving heparin drip and has also received aspirin, IV metoprolol and lorazepam with improvement in blood pressure.      Metoprolol tartrate 25 mg p.o. twice daily    Lipid panel    Serial troponins    Echo    Encourage smoking cessation    Cardiology will follow.      High complexity     Jewell Garcia MD, MD    Primary Care Physician   Shannen Rodas    Reason for Consult   Reason for consult: I was asked by Dr. Herrera non-STEMI to evaluate this patient for chest pain.    History of Present Illness   Linda Guadalupe is a 62 year old female smoker with no other significant known past medical history other than chronic back pain who presents with shakiness and chest pain.  She called her son who was going to pick her up to take her to the emergency department when she suddenly experienced severe chest pain for about 10 seconds.  The pain took her breath away but resolved quickly.  She was standing up when it occurred and it resolved spontaneously.  The pain did not radiate.    She describes her self is an active person and walks around all the time because she works as a  at 2 different restaurants.  She smokes half a pack a day but is thinking maybe she should quit.  She has never had abnormal dyspnea and has no previous history of chest pain, dizziness, or palpitations.    Past Medical History   Past Medical History:   Diagnosis Date     Tobacco abuse 11/13/2012         Past Surgical History   Past Surgical  History:   Procedure Laterality Date     ORTHOPEDIC SURGERY  1996    R shoulder         Prior to Admission Medications   Prior to Admission Medications   Prescriptions Last Dose Informant Patient Reported? Taking?   albuterol (PROAIR HFA/PROVENTIL HFA/VENTOLIN HFA) 108 (90 Base) MCG/ACT inhaler  at prn  Yes Yes   Sig: Inhale 1-2 puffs into the lungs every 4 hours as needed for shortness of breath or wheezing   ibuprofen (ADVIL,MOTRIN) 800 MG tablet  at prn  No Yes   Sig: Take 1 tablet (800 mg) by mouth every 8 hours as needed for pain   Patient taking differently: Take 600 mg by mouth every 8 hours as needed for pain   oxyCODONE-acetaminophen (PERCOCET) 5-325 MG per tablet  at prn  No Yes   Sig: Take 1-2 tablets by mouth every 6 hours as needed for moderate to severe pain      Facility-Administered Medications: None     Current Facility-Administered Medications   Medication Dose Route Frequency     Current Facility-Administered Medications   Medication Last Rate     heparin 600 Units/hr (01/20/23 1557)     Allergies   No Known Allergies    Social History    reports that she has been smoking cigarettes. She has a 15.00 pack-year smoking history. She has never used smokeless tobacco. She reports that she does not drink alcohol and does not use drugs.      Family History   Chart indicates patient is adopted.  Patient mentioned mother had heart disease but we did not discuss whether this is her biological or adoptive mother.    Review of Systems   A comprehensive review of system was performed and is negative other than that noted in the HPI or here.     Physical Exam   Vital Signs with Ranges  Temp:  [97.9  F (36.6  C)] 97.9  F (36.6  C)  Pulse:  [] 75  Resp:  [18-27] 26  BP: (142-187)/() 142/105  SpO2:  [98 %-100 %] 98 %  Wt Readings from Last 4 Encounters:   01/20/23 50.2 kg (110 lb 9.6 oz)   02/03/18 59.6 kg (131 lb 7 oz)   04/25/16 49.9 kg (110 lb)   05/18/15 54.9 kg (121 lb)     No intake/output data  recorded.      Vitals: BP (!) 142/105   Pulse 75   Temp 97.9  F (36.6  C) (Oral)   Resp 26   Wt 50.2 kg (110 lb 9.6 oz)   SpO2 98%   BMI 17.85 kg/m      Physical Exam:   General - Alert and oriented to time place and person in no acute distress.  Very thin  Eyes - No scleral icterus  HEENT - Neck supple, moist mucous membranes  Cardiovascular -regular rate and rhythm no murmur appreciated  Extremities - There is no edema  Respiratory -clear bilaterally  Skin - No pallor or cyanosis  Gastrointestinal - Non tender and non distended without rebound or guarding  Psych -normal affect   Neurological - No gross motor neurological focal deficits    No lab results found in last 7 days.    Invalid input(s): TROPONINIES    Recent Labs   Lab 01/20/23  1046   WBC 8.6   HGB 16.0*   MCV 93         POTASSIUM 4.7   CHLORIDE 99   CO2 24   BUN 8.3   CR 0.81   GFRESTIMATED 83   ANIONGAP 13   VETO 9.9   *   ALBUMIN 5.2   PROTTOTAL 7.8   BILITOTAL 0.6   ALKPHOS 88   ALT 22   AST 32     No results for input(s): CHOL, HDL, LDL, TRIG, CHOLHDLRATIO in the last 57352 hours.  Recent Labs   Lab 01/20/23  1046   WBC 8.6   HGB 16.0*   HCT 47.2*   MCV 93        No results for input(s): PH, PHV, PO2, PO2V, SAT, PCO2, PCO2V, HCO3, HCO3V in the last 168 hours.  No results for input(s): NTBNPI, NTBNP in the last 168 hours.  Recent Labs   Lab 01/20/23  1046   DD <0.27     No results for input(s): SED, CRP in the last 168 hours.  Recent Labs   Lab 01/20/23  1046        No results for input(s): TSH in the last 168 hours.  No results for input(s): COLOR, APPEARANCE, URINEGLC, URINEBILI, URINEKETONE, SG, UBLD, URINEPH, PROTEIN, UROBILINOGEN, NITRITE, LEUKEST, RBCU, WBCU in the last 168 hours.    Imaging:  Recent Results (from the past 48 hour(s))   XR Chest 2 Views    Narrative    CHEST TWO VIEWS 1/20/2023 11:02 AM     HISTORY: Shortness of breath.    COMPARISON: May 11, 2016       Impression    IMPRESSION: There are  no acute infiltrates. The cardiac silhouette is  not enlarged. Pulmonary vasculature is unremarkable.    CLOTILDE LOO MD         SYSTEM ID:  [id number]      EKG sinus tachycardia biatrial enlargement, incomplete right bundle branch block, left anterior fascicular block.  First EKG was significant baseline artifact second EKG looks similar but with less artifact.    Echo:  Pending    Clinically Significant Risk Factors Present on Admission

## 2023-01-20 NOTE — ED TRIAGE NOTES
"SOB - not feeling well for past week \"feeling off\"- chest tightness - arms shaking   Didn't sleep well last night   Covid test negative earlier this week        Triage Assessment       Row Name 01/20/23 1034       Triage Assessment (Adult)    Airway WDL WDL    Additional Documentation Breath Sounds (Group)       Respiratory WDL    Respiratory WDL WDL       Breath Sounds    Breath Sounds All Fields    All Lung Fields Breath Sounds clear       Cardiac WDL    Cardiac WDL X       Cognitive/Neuro/Behavioral WDL    Cognitive/Neuro/Behavioral WDL WDL                  "

## 2023-01-20 NOTE — Clinical Note
The first balloon was inserted into the left anterior descending and proximal left anterior descending.Max pressure = 12 jaguar. Total duration = 21 seconds.     Max pressure = 12 jaguar. Total duration = 20 seconds.    Balloon reinflated a second time: Max pressure = 12 jaguar. Total duration = 20 seconds.

## 2023-01-20 NOTE — Clinical Note
Potential access sites were evaluated for patency using ultrasound.   The right radial vein was selected. Access was obtained under with Sonosite guidance using a standard 18 guage needle with direct visualization of needle entry.

## 2023-01-20 NOTE — PHARMACY-ADMISSION MEDICATION HISTORY
Pharmacy Medication History  Admission medication history interview status for the 1/20/2023  admission is complete. See EPIC admission navigator for prior to admission medications     Location of Interview: Patient room  Medication history sources: Patient    Significant changes made to the medication list:  Added: Albuterol inhaler  Removed: Gabapentin    In the past week, patient estimated taking medication this percent of the time: greater than 90%    Medication Affordability:  Not including over the counter (OTC) medications, was there a time in the past 12 months when you did not take your medications as prescribed because of cost?: No    Additional medication history information:   None    Medication reconciliation completed by provider prior to medication history? No    Time spent in this activity: 15 minutes    Prior to Admission medications    Medication Sig Last Dose Taking? Auth Provider Long Term End Date   albuterol (PROAIR HFA/PROVENTIL HFA/VENTOLIN HFA) 108 (90 Base) MCG/ACT inhaler Inhale 1-2 puffs into the lungs every 4 hours as needed for shortness of breath or wheezing  at prn Yes Unknown, Entered By History     ibuprofen (ADVIL,MOTRIN) 800 MG tablet Take 1 tablet (800 mg) by mouth every 8 hours as needed for pain  Patient taking differently: Take 600 mg by mouth every 8 hours as needed for pain  at prn Yes Cristine Nickerson APRN CNP     oxyCODONE-acetaminophen (PERCOCET) 5-325 MG per tablet Take 1-2 tablets by mouth every 6 hours as needed for moderate to severe pain  at prn Yes Alireza Badillo MD         The information provided in this note is only as accurate as the sources available at the time of update(s)

## 2023-01-20 NOTE — Clinical Note
The first balloon was inserted into the left anterior descending and proximal left anterior descending.Max pressure = 11 jaguar. Total duration = 16 seconds.     Max pressure = 11 jaguar. Total duration = 13 seconds.    Balloon reinflated a second time: Max pressure = 11 jaguar. Total duration = 13 seconds.

## 2023-01-20 NOTE — ED PROVIDER NOTES
History     Chief Complaint:  Shortness of Breath       HPI   Linda Guadalupe is a 62 year old female who presents with shortness of breath and chest pain.  She notes she has not felt normal since Sunday.  She has difficulty describing it.  She did to go home COVID test which was negative.  She been having difficulty with sleep and increased stress with just general living as well as death of a family member within the last couple of months.  She is a recovering alcoholic and has been maybe a 12 pack a week for the last several months.  She does smoke as well.  She has no cardiac history.  She notes today that she felt like she could not breathe and had some chest discomfort and some left arm tightening.  She felt very panicked and nervous at home and family noted that as well.  She does not have an underlying history of mental health issues.  Here she was panicked in the triage area stating he cannot breathe and cannot breathe.  She not had any recent trips or travels.  She feels anxious and overwhelmed.      Independent Historian: History is gathered from the patient the niece that lives with her and her son that at her side    Review of External Notes: Care everywhere and past medical history    ROS:  Review of Systems  Positive for shortness of breath and chest pain negative for fevers chills cough also positive for anxiety and nervousness as well as insomnia and feeling off    Allergies:  No Known Allergies     Medications:    No current outpatient medications on file.      Past Medical History:    Past Medical History:   Diagnosis Date     Tobacco abuse 11/13/2012       Past Surgical History:    Past Surgical History:   Procedure Laterality Date     ORTHOPEDIC SURGERY  1996    R shoulder        Family History:    family history includes Unknown/Adopted in her father and mother.    Social History:   reports that she has been smoking cigarettes. She has a 15.00 pack-year smoking history. She has never used  smokeless tobacco. She reports that she does not drink alcohol and does not use drugs.  PCP: Shannen Rodas     Physical Exam     Patient Vitals for the past 24 hrs:   BP Temp Temp src Pulse Resp SpO2 Weight   01/20/23 2120 126/72 98.1  F (36.7  C) Oral 84 22 96 % 50.6 kg (111 lb 9.6 oz)   01/20/23 2100 -- -- -- 75 20 97 % --   01/20/23 2045 -- -- -- 92 30 98 % --   01/20/23 2030 (!) 140/98 -- -- 78 26 98 % --   01/20/23 1800 130/88 -- -- 75 28 97 % --   01/20/23 1700 (!) 142/105 -- -- 75 26 98 % --   01/20/23 1600 (!) 157/105 -- -- 80 23 99 % --   01/20/23 1528 (!) 149/101 -- -- 110 27 98 % 50.2 kg (110 lb 9.6 oz)   01/20/23 1212 (!) 187/97 -- -- 118 18 99 % --   01/20/23 1036 (!) 175/121 97.9  F (36.6  C) Oral (!) 142 24 100 % --        Physical Exam  GENERAL: well developed, pleasant  HEAD: atraumatic  EYES: pupils reactive, extraocular muscles intact, conjunctivae normal  ENT:  mucus membranes moist  NECK:  trachea midline, normal range of motion  RESPIRATORY: no tachypnea, breath sounds clear to auscultation   CVS: normal S1/S2, no murmurs, intact distal pulses  ABDOMEN: soft, nontender, nondistention  MUSCULOSKELETAL: no deformities  SKIN: warm and dry, no acute rashes or ulceration  NEURO: GCS 15, cranial nerves intact, alert and oriented x3  PSYCH:  Mood/affect normal        Emergency Department Course   ECG:  ECG results from 01/20/23   EKG 12-lead, tracing only     Value    Systolic Blood Pressure     Diastolic Blood Pressure     Ventricular Rate 127    Atrial Rate 127    NE Interval 152    QRS Duration 94        QTc 450    P Axis 86    R AXIS -65    T Axis 63    Interpretation ECG       Critical Test Result: STEMI  Sinus tachycardia  Possible Left atrial enlargement  Left axis deviation  Pulmonary disease pattern  Incomplete right bundle branch block  ST elevation consider anterior injury or acute infarct  ** ** ACUTE MI / STEMI ** **  Abnormal ECG  When compared with ECG of 11-MAY-2016  00:06,  Vent. rate has increased BY  46 BPM  Incomplete right bundle branch block is now Present  Confirmed by GENERATED REPORT, COMPUTER ([id number]),  Carina Ford ([id number]) on 1/20/2023 1:14:19 PM         Imaging:  XR Chest 2 Views   Final Result   IMPRESSION: There are no acute infiltrates. The cardiac silhouette is   not enlarged. Pulmonary vasculature is unremarkable.      CLOTILDE LOO MD            SYSTEM ID:  [id number]      Echocardiogram Complete    (Results Pending)      Report per radiology    Laboratory:  Labs Ordered and Resulted from Time of ED Arrival to Time of ED Departure   COMPREHENSIVE METABOLIC PANEL - Abnormal       Result Value    Sodium 136      Potassium 4.7      Chloride 99      Carbon Dioxide (CO2) 24      Anion Gap 13      Urea Nitrogen 8.3      Creatinine 0.81      Calcium 9.9      Glucose 171 (*)     Alkaline Phosphatase 88      AST 32      ALT 22      Protein Total 7.8      Albumin 5.2      Bilirubin Total 0.6      GFR Estimate 83     CBC WITH PLATELETS AND DIFFERENTIAL - Abnormal    WBC Count 8.6      RBC Count 5.06      Hemoglobin 16.0 (*)     Hematocrit 47.2 (*)     MCV 93      MCH 31.6      MCHC 33.9      RDW 12.4      Platelet Count 396      % Neutrophils 65      % Lymphocytes 25      % Monocytes 7      % Eosinophils 2      % Basophils 1      % Immature Granulocytes 0      NRBCs per 100 WBC 0      Absolute Neutrophils 5.5      Absolute Lymphocytes 2.2      Absolute Monocytes 0.6      Absolute Eosinophils 0.1      Absolute Basophils 0.1      Absolute Immature Granulocytes 0.0      Absolute NRBCs 0.0     TROPONIN T, HIGH SENSITIVITY - Abnormal    Troponin T, High Sensitivity 80 (*)    TROPONIN T, HIGH SENSITIVITY - Abnormal    Troponin T, High Sensitivity 114 (*)    D DIMER QUANTITATIVE - Normal    D-Dimer Quantitative <0.27     TROPONIN T, HIGH SENSITIVITY - Normal    Troponin T, High Sensitivity 10     ETHYL ALCOHOL LEVEL - Normal    Alcohol ethyl <0.01     COVID-19 VIRUS  (CORONAVIRUS) BY PCR - Normal    SARS CoV2 PCR Negative     LIPID PROFILE   TROPONIN T, HIGH SENSITIVITY        Procedures   na    Emergency Department Course & Assessments:             Interventions:  Medications   heparin 25,000 units in 0.45% NaCl 250 mL ANTICOAGULANT infusion (600 Units/hr Intravenous Rate/Dose Verify 1/20/23 2118)   albuterol (PROVENTIL HFA/VENTOLIN HFA) inhaler (has no administration in time range)   oxyCODONE-acetaminophen (PERCOCET) 5-325 MG per tablet 1 tablet (1 tablet Oral Given 1/20/23 1831)   aspirin EC tablet 81 mg (has no administration in time range)   nitroGLYcerin (NITROSTAT) sublingual tablet 0.4 mg (has no administration in time range)   alum & mag hydroxide-simethicone (MAALOX) suspension 30 mL (has no administration in time range)   lidocaine 1 % 0.1-1 mL (has no administration in time range)   lidocaine (LMX4) cream (has no administration in time range)   sodium chloride (PF) 0.9% PF flush 3 mL (3 mLs Intracatheter Not Given 1/20/23 2138)   sodium chloride (PF) 0.9% PF flush 3 mL (has no administration in time range)   acetaminophen (TYLENOL) tablet 650 mg (has no administration in time range)     Or   acetaminophen (TYLENOL) Suppository 650 mg (has no administration in time range)   morphine (PF) injection 2 mg (has no administration in time range)   morphine (PF) injection 1 mg (has no administration in time range)   metoprolol tartrate (LOPRESSOR) tablet 25 mg ( Oral Canceled Entry 1/20/23 1858)   naloxone (NARCAN) injection 0.2 mg (has no administration in time range)     Or   naloxone (NARCAN) injection 0.4 mg (has no administration in time range)     Or   naloxone (NARCAN) injection 0.2 mg (has no administration in time range)     Or   naloxone (NARCAN) injection 0.4 mg (has no administration in time range)   melatonin tablet 5 mg (has no administration in time range)   heparin - BOLUS DOSE from infusion (has no administration in time range)   LORazepam (ATIVAN) tablet 1  mg (1 mg Oral Given 1/20/23 1052)   aspirin (ASA) chewable tablet 324 mg (324 mg Oral Given 1/20/23 1549)   heparin loading dose for LOW INTENSITY TREATMENT * Give BEFORE starting heparin infusion (3,000 Units Intravenous Given 1/20/23 1558)   metoprolol (LOPRESSOR) injection 5 mg (5 mg Intravenous Given 1/20/23 1549)        Independent Interpretation (X-rays, CTs, rhythm strip):  Chest xray     Consultations/Discussion of Management or Tests:  na        Social Determinants of Health affecting care:  na     Disposition:  The patient was admitted to the hospital under the care of Dr. Herrera     Impression & Plan    CMS Diagnoses: None          Medical Decision Making:  Patient presents with shortness of breath and left arm pain.  She appears very anxious and tearful.  She has risk factors for CAD so ACS is considered, also PE, anxiety, malignancy, infectious etiology amongst others.  She hasn't felt normal for several days without clear definition of symptoms.  She notes chronic stress and new worsening stress with underlying alcohol use although not excessive currently.  Initially EKG read STEMI although there was considerable artifact and highly variable baseline, so repeat shortly there after was obtained and does not show STEMI.  She was given Ativan and workup pursued.  Initial round of tests are normal, with normal troponin, d dimer, and chest xray.  We did not have any beds in the ED so I checked on her a couple times out in the waiting room.  She was watching TV and didn't have any further episodes.  Repeat troponin due to risk factors and concern, is elevated.  She was given aspirin and heparin and spoke with hospitalist for admission.    Critical Care time:  was 0 minutes for this patient excluding procedures.    Diagnosis:    ICD-10-CM    1. Hypertensive urgency  I16.0 Lipid Profile     Lipid Profile     Lipid Profile      2. NSTEMI (non-ST elevated myocardial infarction) (H)  I21.4 Lipid Profile      Lipid Profile     Lipid Profile           Discharge Medications:  Current Discharge Medication List           Scribe Disclosure:  Syed PERERA MD, am serving as a scribe at 10:44 AM on 1/20/2023 to document services personally performed by Syed Willis MD based on my observations and the provider's statements to me.     1/20/2023   Syed Willis MD Adams, Shaun L, MD  01/20/23 7042

## 2023-01-20 NOTE — LETTER
Patient:  Linda Guadalupe  :   1960  MRN:     [id number]      2023    Patient Name:  Linda Mckeon may return to work without restrictions on 23.     Please contact me if you have any questions.        Alayna Avina PA-C        [id number]  1960

## 2023-01-21 ENCOUNTER — APPOINTMENT (OUTPATIENT)
Dept: CARDIOLOGY | Facility: CLINIC | Age: 63
End: 2023-01-21
Attending: INTERNAL MEDICINE
Payer: COMMERCIAL

## 2023-01-21 LAB
CHOLEST SERPL-MCNC: 226 MG/DL
HBA1C MFR BLD: 5.4 %
HDLC SERPL-MCNC: 61 MG/DL
LDLC SERPL CALC-MCNC: 141 MG/DL
LVEF ECHO: NORMAL
NONHDLC SERPL-MCNC: 165 MG/DL
TRIGL SERPL-MCNC: 118 MG/DL
TROPONIN T SERPL HS-MCNC: 51 NG/L
UFH PPP CHRO-ACNC: 0.23 IU/ML
UFH PPP CHRO-ACNC: 0.34 IU/ML
UFH PPP CHRO-ACNC: 0.51 IU/ML

## 2023-01-21 PROCEDURE — 85520 HEPARIN ASSAY: CPT | Performed by: INTERNAL MEDICINE

## 2023-01-21 PROCEDURE — 255N000002 HC RX 255 OP 636: Performed by: INTERNAL MEDICINE

## 2023-01-21 PROCEDURE — 93306 TTE W/DOPPLER COMPLETE: CPT | Mod: 26 | Performed by: INTERNAL MEDICINE

## 2023-01-21 PROCEDURE — 999N000208 ECHOCARDIOGRAM COMPLETE

## 2023-01-21 PROCEDURE — 36415 COLL VENOUS BLD VENIPUNCTURE: CPT | Performed by: INTERNAL MEDICINE

## 2023-01-21 PROCEDURE — 99233 SBSQ HOSP IP/OBS HIGH 50: CPT | Performed by: INTERNAL MEDICINE

## 2023-01-21 PROCEDURE — 250N000011 HC RX IP 250 OP 636: Performed by: EMERGENCY MEDICINE

## 2023-01-21 PROCEDURE — 250N000013 HC RX MED GY IP 250 OP 250 PS 637: Performed by: PHYSICIAN ASSISTANT

## 2023-01-21 PROCEDURE — 210N000002 HC R&B HEART CARE

## 2023-01-21 PROCEDURE — 84484 ASSAY OF TROPONIN QUANT: CPT | Performed by: INTERNAL MEDICINE

## 2023-01-21 PROCEDURE — 250N000013 HC RX MED GY IP 250 OP 250 PS 637: Performed by: INTERNAL MEDICINE

## 2023-01-21 PROCEDURE — 93010 ELECTROCARDIOGRAM REPORT: CPT | Performed by: INTERNAL MEDICINE

## 2023-01-21 PROCEDURE — 93005 ELECTROCARDIOGRAM TRACING: CPT

## 2023-01-21 RX ORDER — ROSUVASTATIN CALCIUM 20 MG/1
20 TABLET, COATED ORAL DAILY
Status: DISCONTINUED | OUTPATIENT
Start: 2023-01-21 | End: 2023-01-24 | Stop reason: HOSPADM

## 2023-01-21 RX ORDER — HYDROXYZINE HYDROCHLORIDE 25 MG/1
50 TABLET, FILM COATED ORAL EVERY 6 HOURS PRN
Status: DISCONTINUED | OUTPATIENT
Start: 2023-01-21 | End: 2023-01-24 | Stop reason: HOSPADM

## 2023-01-21 RX ORDER — HYDROXYZINE HYDROCHLORIDE 25 MG/1
25 TABLET, FILM COATED ORAL EVERY 6 HOURS PRN
Status: DISCONTINUED | OUTPATIENT
Start: 2023-01-21 | End: 2023-01-24 | Stop reason: HOSPADM

## 2023-01-21 RX ORDER — OXYCODONE AND ACETAMINOPHEN 5; 325 MG/1; MG/1
1 TABLET ORAL EVERY 8 HOURS PRN
Status: DISCONTINUED | OUTPATIENT
Start: 2023-01-21 | End: 2023-01-22

## 2023-01-21 RX ADMIN — ROSUVASTATIN CALCIUM 20 MG: 20 TABLET, FILM COATED ORAL at 11:33

## 2023-01-21 RX ADMIN — METOPROLOL TARTRATE 25 MG: 25 TABLET, FILM COATED ORAL at 08:53

## 2023-01-21 RX ADMIN — OXYCODONE HYDROCHLORIDE AND ACETAMINOPHEN 1 TABLET: 5; 325 TABLET ORAL at 08:53

## 2023-01-21 RX ADMIN — HUMAN ALBUMIN MICROSPHERES AND PERFLUTREN 9 ML: 10; .22 INJECTION, SOLUTION INTRAVENOUS at 08:28

## 2023-01-21 RX ADMIN — HEPARIN SODIUM 900 UNITS/HR: 10000 INJECTION, SOLUTION INTRAVENOUS at 17:17

## 2023-01-21 RX ADMIN — OXYCODONE HYDROCHLORIDE AND ACETAMINOPHEN 1 TABLET: 5; 325 TABLET ORAL at 15:26

## 2023-01-21 RX ADMIN — OXYCODONE HYDROCHLORIDE AND ACETAMINOPHEN 1 TABLET: 5; 325 TABLET ORAL at 23:45

## 2023-01-21 RX ADMIN — HYDROXYZINE HYDROCHLORIDE 25 MG: 25 TABLET ORAL at 13:27

## 2023-01-21 RX ADMIN — ASPIRIN 81 MG: 81 TABLET, COATED ORAL at 08:53

## 2023-01-21 RX ADMIN — MELATONIN 5 MG TABLET 5 MG: at 23:45

## 2023-01-21 RX ADMIN — METOPROLOL TARTRATE 25 MG: 25 TABLET, FILM COATED ORAL at 20:13

## 2023-01-21 RX ADMIN — HYDROXYZINE HYDROCHLORIDE 25 MG: 25 TABLET ORAL at 23:45

## 2023-01-21 RX ADMIN — HEPARIN SODIUM 900 UNITS/HR: 10000 INJECTION, SOLUTION INTRAVENOUS at 15:30

## 2023-01-21 ASSESSMENT — ACTIVITIES OF DAILY LIVING (ADL)
ADLS_ACUITY_SCORE: 35

## 2023-01-21 NOTE — PLAN OF CARE
Care plan note:      Recent Vitals:  Temp: 98  F (36.7  C) Temp src: Oral BP: 105/63 Pulse: 66   Resp: 16 SpO2: 96 % O2 Device: None (Room air)      Orientation/Neuro: Alert and Oriented x 4  Pain: The patient is not having any pain.   Tele: Sinus rhythm    IV medications: Heparin   Mobility: up Independently   Skin: With in normal limits   GI: WDL  : WDL     Diet: Tolerating diet:   Well  Orders Placed This Encounter      Low Saturated Fat Diet      Safety/Concerns:  None  Aggression Color: Green    Plan:  Denies chest pain, heparin drip infusing. Coronary angiogram on Monday   Continue to monitor.      Karen Roberson RN

## 2023-01-21 NOTE — ED NOTES
[facility]  ED Nurse Handoff Report    ED Chief complaint: Shortness of Breath      ED Diagnosis:   Final diagnoses:   NSTEMI (non-ST elevated myocardial infarction) (H)       Code Status: Full Code    Allergies: No Known Allergies    Patient Story: Feeling of chest pain, uneasy. Insomnia.  Feeling off all week.  Tremor to the left hand.  Feeling shaking & felt as if a spirit or tommy was hovering beside her all week.    Chest tightness this morning.  Feeling anxious, panicked.  SOB.  Ativan given.    Repeat troponin this evening 114.      Focused Assessment:  Reports she has back problems & takes percocet 4x per day.       Treatments and/or interventions provided: NPO at midnight  Patient's response to treatments and/or interventions:     To be done/followed up on inpatient unit:      Does this patient have any cognitive concerns?: Alert & oriented x4    Activity level - Baseline/Home:  Independent  Activity Level - Current:   Stand with Assist    Patient's Preferred language: English   Needed?: No    Isolation: None  Infection: Not Applicable  Patient tested for COVID 19 prior to admission: YES  Bariatric?: No    Vital Signs:   Vitals:    01/20/23 1600 01/20/23 1700 01/20/23 1800 01/20/23 2030   BP: (!) 157/105 (!) 142/105 130/88 (!) 140/98   Pulse: 80 75 75 78   Resp: 23 26 28 26   Temp:       TempSrc:       SpO2: 99% 98% 97% 98%   Weight:           Cardiac Rhythm:     Was the PSS-3 completed:   Yes  What interventions are required if any?               Family Comments: Family with patient.   OBS brochure/video discussed/provided to patient/family: N/A              Name of person given brochure if not patient:               Relationship to patient:     For the majority of the shift this patient's behavior was Green.   Behavioral interventions performed were .    ED NURSE PHONE NUMBER:

## 2023-01-21 NOTE — PROGRESS NOTES
[facility] Cardiology Progress Note    Date of Admission:  1/20/2023  Reason for Consult: NSTEMI    Subjective   Ms. Guadalupe is a 62-year-old female admitted with a NSTEMI.    No acute events overnight.  Patient's blood pressure this morning is much better controlled.  She noted she mainly presented with shortness of breath and shakiness.  Currently is chest pain-free and feels well.  She works as a .  She eats a lot of potato chips given that her son works in this industry.  She has been a smoker of roughly half pack per day since her teenage years.    Objective   INTAKE / OUTPUT   No intake or output data in the 24 hours ending 01/21/23 0853    VITAL SIGNS  Temp:  [97.9  F (36.6  C)-98.1  F (36.7  C)] 98  F (36.7  C)  Pulse:  [] 55  Resp:  [18-30] 18  BP: (105-187)/() 105/90  SpO2:  [96 %-100 %] 96 %  111 lbs 9.6 oz    PHYSICAL EXAM   Constitutional: Awake, alert, cooperative, no apparent distress.  Neck: No JVD  Cardiovascular: Regular rate and rhythm, normal S1 and S2, and no murmurs/rubs/gallops noted.  Respiratory: Clear to auscultation bilaterally, no crackles or wheezing.  Extremities: No lower extremity edema. Warm.   Vascular: Radial pulses 4+ and symmetric bilaterally    Data   Most Recent 3 CBC's:  Recent Labs   Lab Test 01/20/23  1046 05/11/16  0042   WBC 8.6 14.3*   HGB 16.0* 12.7   MCV 93 96    373     Most Recent 3  BMP's:  Recent Labs   Lab Test 01/20/23  1046 05/11/16  0042    136   POTASSIUM 4.7 3.6   CHLORIDE 99 101   CO2 24 30   BUN 8.3 9   CR 0.81 0.63   ANIONGAP 13 5   VETO 9.9 9.1   * 107*     Most Recent Cholesterol Panel:  Recent Labs   Lab Test 01/20/23  1347   CHOL 226*   *   HDL 61   TRIG 118       Most Recent Transthoracic Echocardiogram:  Echo result w/o MOPS:    Await formal read.  Images personally reviewed.  Patient has significant apical hypokinesis.  There is also hypokinesis of the  inferoseptum as well.    Most Recent Cardiac Catheterization:  No results found.    Chest radiograph does not show any cardiac enlargement or signs of volume overload.  There is lung hyperinflation.    ECG from 1/20 at 1049 shows incomplete right bundle branch block.  Sinus tachycardia to 127 bpm.  T wave inversions in V1-V3.  Left axis deviation.  Left atrial enlargement.    Assessment & Plan   Ms. Guadalupe is a pleasant 62 year old year old female admitted with a NSTEMI.    1.  NSTEMI, likley type 1   2.  Hypertensive urgency improving  3.  Nicotine dependence  4.  Hyperlipidemia,   5.  Acute on chronic back pain    The patient presents with a NSTEMI with rise/fall and troponin and transthoracic echocardiogram (images personally reviewed) shows apical/inferoseptum hypokinesis concerning for a type I mechanism.  We will continue the patient on IV heparin and aspirin and add high intensity statin to her regimen while continuing her metoprolol tartrate.  It will be very important that she discontinues her nicotine dependence and recommend nicotine dependency consult.  We will go forth with a coronary angiogram to delineate the coronary anatomy and likely this will occur on Monday.  I discussed pursuing significant lifestyle modifications including eliminating her smoking and cutting back on her saturated fat (potato chip intake).    Plan:  -Continue aspirin, IV heparin, add rosuvastatin 20 mg daily  -Continue metoprolol tartrate 25 mg twice daily  -Cardiac rehabilitation consultation and nicotine cessation consultation  -Coronary angiogram +/- PCI, likely on Monday.  We will follow-up formal TTE read.  -Repeat ECG today    Thank you for involving us in the care of this patient. We will continue to follow.     Ricardo Granados M.D.   1/21/2023

## 2023-01-21 NOTE — PROGRESS NOTES
RECEIVING UNIT ED HANDOFF REVIEW    ED Nurse Handoff Report was reviewed by: Mckay Monterroso RN on January 20, 2023 at 8:38 PM

## 2023-01-21 NOTE — PROGRESS NOTES
A&O x 4, anxious at times. VSS on Ra, back pain managed with Percocet.. Denied SOB. Up SBA to bathroom. Heparin gtt infusing at 750 units/hr. Xa recheck at 10: 20 am. Tele SR. Plan for Echo today. Continue plan of care.

## 2023-01-21 NOTE — PROGRESS NOTES
[facility]    Medicine Progress Note - Hospitalist Service    Date of Admission:  1/20/2023    Assessment & Plan   Linda Guadalupe is a 62 year old female with history of tobacco use (smokes half pack of cigarettes per day) who presents with uneasy feeling, also an episode of chest pain. Admitted on 1/20 with NSTEMI.    NSTEMI (non-ST elevated myocardial infarction) (H)  Troponin peaked at 105, now downtrending. TTE showing apical abnormality, felt to be true NSTEMI.    Cardiology on board, plan for angiogram Monday 1/23    Continue heparin gtt    Continue ASA    Start rosuvastatin 20mg daily    Essential hypertension  -Start metoprolol 25mg BID    Hyperlipidemia  -Start rosuvastatin 20mg daily    Tobacco use disorder  -Smoking cessation counseling    Hyperglycemia  Noted on ER CMP.  -Add-on A1c today       Diet: Low Saturated Fat Diet    DVT Prophylaxis: Heparin as above  Coles Catheter: Not present  Lines: None     Cardiac Monitoring: ACTIVE order. Indication: Chest pain/ ACS rule out (24 hours)  Code Status: Full Code      Clinically Significant Risk Factors Present on Admission                               Disposition Plan      Expected Discharge Date: 01/24/2023                  Naren Garcia MD  Hospitalist Service  [facility]  Securely message with [employer] (more info)  Text page via [employer] Paging/Directory   ______________________________________________________________________    Interval History   -I assumed care this AM  -TTE concerning for apical abnormality  -Patient denies chest pain, SOB, endorses anxiety with diagnosis    Physical Exam   Vital Signs: Temp: 98  F (36.7  C) Temp src: Oral BP: (!) 105/90 Pulse: 55   Resp: 16 SpO2: 96 % O2 Device: None (Room air)    Weight: 111 lbs 9.6 oz    GENERAL: alert and in no distress.  EYES: conjunctivae/corneas clear. EOMs grossly intact  HENT: Facies symmetric.  RESP: CTAB, no w/r/r.  CV: RRR, no m/r/g.  GI: NT,  ND, without rebound tenderness or guarding  MSK: No edema. Moves all four extremities freely.  SKIN: No significant ulcers, lesions, or rashes on the visualized portions of the skin  NEURO: CN II-XII grossly intact.    Medical Decision Making       Data   ------------------------- PAST 24 HR DATA REVIEWED -----------------------------------------------

## 2023-01-22 LAB
ANION GAP SERPL CALCULATED.3IONS-SCNC: 7 MMOL/L (ref 7–15)
BUN SERPL-MCNC: 14.5 MG/DL (ref 8–23)
CALCIUM SERPL-MCNC: 9.3 MG/DL (ref 8.8–10.2)
CHLORIDE SERPL-SCNC: 102 MMOL/L (ref 98–107)
CREAT SERPL-MCNC: 0.7 MG/DL (ref 0.51–0.95)
DEPRECATED HCO3 PLAS-SCNC: 26 MMOL/L (ref 22–29)
ERYTHROCYTE [DISTWIDTH] IN BLOOD BY AUTOMATED COUNT: 12.3 % (ref 10–15)
GFR SERPL CREATININE-BSD FRML MDRD: >90 ML/MIN/1.73M2
GLUCOSE SERPL-MCNC: 102 MG/DL (ref 70–99)
HCT VFR BLD AUTO: 39 % (ref 35–47)
HGB BLD-MCNC: 13.7 G/DL (ref 11.7–15.7)
MCH RBC QN AUTO: 32.6 PG (ref 26.5–33)
MCHC RBC AUTO-ENTMCNC: 35.1 G/DL (ref 31.5–36.5)
MCV RBC AUTO: 93 FL (ref 78–100)
PLATELET # BLD AUTO: 266 10E3/UL (ref 150–450)
POTASSIUM SERPL-SCNC: 4.4 MMOL/L (ref 3.4–5.3)
RBC # BLD AUTO: 4.2 10E6/UL (ref 3.8–5.2)
SODIUM SERPL-SCNC: 135 MMOL/L (ref 136–145)
UFH PPP CHRO-ACNC: 0.21 IU/ML
UFH PPP CHRO-ACNC: 0.37 IU/ML
UFH PPP CHRO-ACNC: 0.4 IU/ML
WBC # BLD AUTO: 6.9 10E3/UL (ref 4–11)

## 2023-01-22 PROCEDURE — 250N000013 HC RX MED GY IP 250 OP 250 PS 637: Performed by: PHYSICIAN ASSISTANT

## 2023-01-22 PROCEDURE — 85520 HEPARIN ASSAY: CPT | Performed by: INTERNAL MEDICINE

## 2023-01-22 PROCEDURE — 210N000002 HC R&B HEART CARE

## 2023-01-22 PROCEDURE — 36415 COLL VENOUS BLD VENIPUNCTURE: CPT | Performed by: INTERNAL MEDICINE

## 2023-01-22 PROCEDURE — 99232 SBSQ HOSP IP/OBS MODERATE 35: CPT | Performed by: INTERNAL MEDICINE

## 2023-01-22 PROCEDURE — 85027 COMPLETE CBC AUTOMATED: CPT | Performed by: INTERNAL MEDICINE

## 2023-01-22 PROCEDURE — 250N000011 HC RX IP 250 OP 636: Performed by: EMERGENCY MEDICINE

## 2023-01-22 PROCEDURE — 250N000013 HC RX MED GY IP 250 OP 250 PS 637: Performed by: INTERNAL MEDICINE

## 2023-01-22 PROCEDURE — 80048 BASIC METABOLIC PNL TOTAL CA: CPT | Performed by: INTERNAL MEDICINE

## 2023-01-22 RX ORDER — METOPROLOL TARTRATE 25 MG/1
25 TABLET, FILM COATED ORAL 2 TIMES DAILY
Status: DISCONTINUED | OUTPATIENT
Start: 2023-01-22 | End: 2023-01-24

## 2023-01-22 RX ORDER — OXYCODONE AND ACETAMINOPHEN 5; 325 MG/1; MG/1
1 TABLET ORAL EVERY 4 HOURS PRN
Status: DISCONTINUED | OUTPATIENT
Start: 2023-01-22 | End: 2023-01-24 | Stop reason: HOSPADM

## 2023-01-22 RX ADMIN — OXYCODONE HYDROCHLORIDE AND ACETAMINOPHEN 1 TABLET: 5; 325 TABLET ORAL at 14:33

## 2023-01-22 RX ADMIN — METOPROLOL TARTRATE 25 MG: 25 TABLET, FILM COATED ORAL at 20:13

## 2023-01-22 RX ADMIN — ASPIRIN 81 MG: 81 TABLET, COATED ORAL at 08:26

## 2023-01-22 RX ADMIN — OXYCODONE HYDROCHLORIDE AND ACETAMINOPHEN 1 TABLET: 5; 325 TABLET ORAL at 08:26

## 2023-01-22 RX ADMIN — OXYCODONE HYDROCHLORIDE AND ACETAMINOPHEN 1 TABLET: 5; 325 TABLET ORAL at 20:13

## 2023-01-22 RX ADMIN — HEPARIN SODIUM 850 UNITS/HR: 10000 INJECTION, SOLUTION INTRAVENOUS at 22:50

## 2023-01-22 RX ADMIN — OXYCODONE HYDROCHLORIDE AND ACETAMINOPHEN 1 TABLET: 5; 325 TABLET ORAL at 23:57

## 2023-01-22 RX ADMIN — ROSUVASTATIN CALCIUM 20 MG: 20 TABLET, FILM COATED ORAL at 08:26

## 2023-01-22 RX ADMIN — HYDROXYZINE HYDROCHLORIDE 25 MG: 25 TABLET ORAL at 23:57

## 2023-01-22 RX ADMIN — MELATONIN 5 MG TABLET 5 MG: at 23:57

## 2023-01-22 ASSESSMENT — ACTIVITIES OF DAILY LIVING (ADL)
ADLS_ACUITY_SCORE: 35

## 2023-01-22 NOTE — PROGRESS NOTES
Pt is A&O x 4, anxious at times. VSS on RA, back pain managed with Percocet x 1 with improvement/ Tele SR. Heparin gtt infusing 850 units/hr  Xa recheck at 10 am,. Plan for Angiogram on Monday 1//23. Continue plan of care.

## 2023-01-22 NOTE — PROGRESS NOTES
[facility] Cardiology Progress Note    Date of Admission:  1/20/2023  Reason for Consult: NSTEMI    Subjective   Ms. Guadalupe is a 62 year old female admitted with an NSTEMI.    No acute events overnight.  This morning she feels well and is without chest pain.  No shortness of breath, PND or orthopnea.    Objective   INTAKE / OUTPUT   No intake or output data in the 24 hours ending 01/22/23 0721    VITAL SIGNS  Temp:  [97.5  F (36.4  C)-98.1  F (36.7  C)] 97.5  F (36.4  C)  Pulse:  [62-72] 62  Resp:  [16] 16  BP: (103-114)/(63-81) 114/69  SpO2:  [95 %-97 %] 95 %  111 lbs 9.6 oz    PHYSICAL EXAM   Constitutional: Awake, alert, cooperative, no apparent distress.  Neck: No JVD  Cardiovascular: Regular rate and rhythm, normal S1 and S2, and no murmurs/rubs/gallops noted.  Respiratory: Clear to auscultation bilaterally, no crackles or wheezing.  Extremities: No lower extremity edema. Warm.   Vascular: Radial pulses 4+ and symmetric bilaterally    Data   Most Recent 3 CBC's:  Recent Labs   Lab Test 01/20/23  1046 05/11/16  0042   WBC 8.6 14.3*   HGB 16.0* 12.7   MCV 93 96    373     Most Recent 3  BMP's:  Recent Labs   Lab Test 01/20/23  1046 05/11/16  0042    136   POTASSIUM 4.7 3.6   CHLORIDE 99 101   CO2 24 30   BUN 8.3 9   CR 0.81 0.63   ANIONGAP 13 5   VETO 9.9 9.1   * 107*     Most Recent 3 BNP's:No lab results found.   Most Recent Cholesterol Panel:  Recent Labs   Lab Test 01/20/23  1347   CHOL 226*   *   HDL 61   TRIG 118       Most Recent Transthoracic Echocardiogram:  Echo result w/o MOPS: Interpretation Summary 1. The left ventricle is normal in size. There is normal left ventricular wallthickness. Left ventricular systolic function is mild to moderately reduced.The visual ejection fraction is 40-45%. Left ventricular diastolic function isabnormal. There is moderate to severe hypokinesis of the mid anterior andanteroseptal walls and all  apical segments of the left ventricle. There is nothrombus seen in the left ventricle.2. Normal right ventricular size and systolic function.3. Trace to mild mitral and tricuspid regurgitation.4. No pericardial effusion.5. No previous study for comparison.    Most Recent Cardiac Catheterization:  No results found.    Assessment & Plan   Ms. Guadalupe is a pleasant 62 year old year old female admitted with a NSTEMI.     1.  NSTEMI, likley type 1   2.  Hypertensive urgency resolved  3.  Nicotine dependence  4.  Hyperlipidemia,   5.  Ischemic cardiomyopathy, heart failure with mildly reduced ejection fraction, LVEF 40-45%  6. Acute on chronic back pain     We will continue her on her current medical therapy and she will be n.p.o. tonight for coronary angiogram plus minus PCI tomorrow.  I would note she did have a mildly reduced ejection fraction (40 to 45%) and we will continue her tartrate given her heart rate and transition to Toprol on dismissal.  I am not sure that she will be able to tolerate an ACE inhibitor given her lower blood pressures though we will see post cardiac catheterization.    Plan:  -Continue aspirin, IV heparin, rosuvastatin 20 mg daily, metoprolol tartrate 25 mg twice daily. Transition to Toprol post angiogram. Following, if BP allows will add low dose ACE inhibitor  -Cardiac rehabilitation consultation and nicotine cessation consultation  -Coronary angiogram +/- PCI, likely on Monday.  NPO midnight.    Thank you for involving us in the care of this patient. We will continue to follow.       Ricardo Granados M.D.   1/22/2023

## 2023-01-22 NOTE — PROGRESS NOTES
[facility]    Hospitalist Progress Note    Interval History   - No chest pain since admission, comfortable, awaiting angiogram tomorrow  - Reports not feeling well all week prior to admission which she attributed to seasonal depression  - Father  from MI at 49    Assessment & Plan   Summary: Linda Guadalupe is a 62 year old female with PMH tobacco use disorder who was admitted on 2023 with NSTEMI.    NSTEMI  Ischemic cardiomyopathy  Patient with one week of malaise and one day of chest pain. On admission noted elevated troponin. TTE shows decreased EF 40-45% and inferior hypokinesis. Note significant family hx father  from MI at 49.  - Appreciate Cardiology consult   - Plan angiogram    - Metoprolol   - Heparin drip   - Rosuvastatin   - ASA    Tobacco use disorder: Patient reports she will quit cold turkey without nicotine aids.    HLD: Statin as above    DVT Prophylaxis: Heparin drip  Code Status: Full Code  PT/OT: not needed  Diet: Low Saturated Fat Diet  NPO per Anesthesia Guidelines for Procedure/Surgery Except for: Meds      Disposition: Expected discharge 2023    Bonilla Puri MD, MD  Text Page  (7am to 6pm)  -Data reviewed today: I reviewed all new labs and imaging results over the last 24 hours.    Physical Exam   Temp: 97.7  F (36.5  C) Temp src: Oral BP: 99/59 Pulse: 66   Resp: 20 SpO2: 96 % O2 Device: None (Room air)    Vitals:    23 1528 23 2120   Weight: 50.2 kg (110 lb 9.6 oz) 50.6 kg (111 lb 9.6 oz)     Vital Signs with Ranges  Temp:  [97.5  F (36.4  C)-98.1  F (36.7  C)] 97.7  F (36.5  C)  Pulse:  [59-72] 66  Resp:  [16-20] 20  BP: ()/(59-81) 99/59  SpO2:  [95 %-98 %] 96 %  No intake/output data recorded.  O2 requirements: none    Constitutional: Thin female in NAD  HEENT: Eyes nonicteric, oral mucosa moist  Cardiovascular: RRR, normal S1/2, no m/r/g  Respiratory: CTAB, no wheezing or crackles  Vascular: No LE pitting edema  GI:  Normoactive bowel sounds, nontender, nondistended  Skin/Integumen: No rashes  Neuro/Psych: Appropriate affect and mood. A&Ox3, moves all extremities    Medications     heparin 850 Units/hr (01/22/23 0824)       aspirin  81 mg Oral Daily     metoprolol tartrate  25 mg Oral BID     rosuvastatin  20 mg Oral Daily     sodium chloride (PF)  3 mL Intracatheter Q8H       Data   Recent Labs   Lab 01/20/23  1046   WBC 8.6   HGB 16.0*   MCV 93         POTASSIUM 4.7   CHLORIDE 99   CO2 24   BUN 8.3   CR 0.81   ANIONGAP 13   VETO 9.9   *   ALBUMIN 5.2   PROTTOTAL 7.8   BILITOTAL 0.6   ALKPHOS 88   ALT 22   AST 32       Imaging:   No results found for this or any previous visit (from the past 24 hour(s)).

## 2023-01-23 LAB
ACT BLD: 233 SECONDS (ref 74–150)
ACT BLD: 318 SECONDS (ref 74–150)
ERYTHROCYTE [DISTWIDTH] IN BLOOD BY AUTOMATED COUNT: 12.2 % (ref 10–15)
HCT VFR BLD AUTO: 42 % (ref 35–47)
HGB BLD-MCNC: 14 G/DL (ref 11.7–15.7)
MCH RBC QN AUTO: 31.6 PG (ref 26.5–33)
MCHC RBC AUTO-ENTMCNC: 33.3 G/DL (ref 31.5–36.5)
MCV RBC AUTO: 95 FL (ref 78–100)
PLATELET # BLD AUTO: 244 10E3/UL (ref 150–450)
RBC # BLD AUTO: 4.43 10E6/UL (ref 3.8–5.2)
UFH PPP CHRO-ACNC: 0.2 IU/ML
WBC # BLD AUTO: 6.8 10E3/UL (ref 4–11)

## 2023-01-23 PROCEDURE — C1725 CATH, TRANSLUMIN NON-LASER: HCPCS | Performed by: INTERNAL MEDICINE

## 2023-01-23 PROCEDURE — 99152 MOD SED SAME PHYS/QHP 5/>YRS: CPT | Performed by: INTERNAL MEDICINE

## 2023-01-23 PROCEDURE — 250N000013 HC RX MED GY IP 250 OP 250 PS 637: Performed by: INTERNAL MEDICINE

## 2023-01-23 PROCEDURE — 258N000003 HC RX IP 258 OP 636: Performed by: STUDENT IN AN ORGANIZED HEALTH CARE EDUCATION/TRAINING PROGRAM

## 2023-01-23 PROCEDURE — 99233 SBSQ HOSP IP/OBS HIGH 50: CPT | Mod: 25 | Performed by: PHYSICIAN ASSISTANT

## 2023-01-23 PROCEDURE — C1894 INTRO/SHEATH, NON-LASER: HCPCS | Performed by: INTERNAL MEDICINE

## 2023-01-23 PROCEDURE — C1769 GUIDE WIRE: HCPCS | Performed by: INTERNAL MEDICINE

## 2023-01-23 PROCEDURE — 99153 MOD SED SAME PHYS/QHP EA: CPT | Performed by: INTERNAL MEDICINE

## 2023-01-23 PROCEDURE — C9600 PERC DRUG-EL COR STENT SING: HCPCS | Performed by: INTERNAL MEDICINE

## 2023-01-23 PROCEDURE — 210N000002 HC R&B HEART CARE

## 2023-01-23 PROCEDURE — 99152 MOD SED SAME PHYS/QHP 5/>YRS: CPT | Mod: GC | Performed by: INTERNAL MEDICINE

## 2023-01-23 PROCEDURE — 36415 COLL VENOUS BLD VENIPUNCTURE: CPT | Performed by: INTERNAL MEDICINE

## 2023-01-23 PROCEDURE — 92928 PRQ TCAT PLMT NTRAC ST 1 LES: CPT | Mod: LD | Performed by: INTERNAL MEDICINE

## 2023-01-23 PROCEDURE — C1874 STENT, COATED/COV W/DEL SYS: HCPCS | Performed by: INTERNAL MEDICINE

## 2023-01-23 PROCEDURE — 272N000001 HC OR GENERAL SUPPLY STERILE: Performed by: INTERNAL MEDICINE

## 2023-01-23 PROCEDURE — 93010 ELECTROCARDIOGRAM REPORT: CPT | Mod: 59 | Performed by: INTERNAL MEDICINE

## 2023-01-23 PROCEDURE — 93005 ELECTROCARDIOGRAM TRACING: CPT

## 2023-01-23 PROCEDURE — 85027 COMPLETE CBC AUTOMATED: CPT | Performed by: EMERGENCY MEDICINE

## 2023-01-23 PROCEDURE — 258N000003 HC RX IP 258 OP 636: Performed by: PHYSICIAN ASSISTANT

## 2023-01-23 PROCEDURE — 92978 ENDOLUMINL IVUS OCT C 1ST: CPT | Mod: LD | Performed by: INTERNAL MEDICINE

## 2023-01-23 PROCEDURE — 93454 CORONARY ARTERY ANGIO S&I: CPT | Performed by: INTERNAL MEDICINE

## 2023-01-23 PROCEDURE — 92978 ENDOLUMINL IVUS OCT C 1ST: CPT | Mod: 26 | Performed by: INTERNAL MEDICINE

## 2023-01-23 PROCEDURE — C1887 CATHETER, GUIDING: HCPCS | Performed by: INTERNAL MEDICINE

## 2023-01-23 PROCEDURE — 250N000011 HC RX IP 250 OP 636: Performed by: INTERNAL MEDICINE

## 2023-01-23 PROCEDURE — 250N000009 HC RX 250: Performed by: INTERNAL MEDICINE

## 2023-01-23 PROCEDURE — 250N000013 HC RX MED GY IP 250 OP 250 PS 637: Performed by: PHYSICIAN ASSISTANT

## 2023-01-23 PROCEDURE — B240ZZ3 ULTRASONOGRAPHY OF SINGLE CORONARY ARTERY, INTRAVASCULAR: ICD-10-PCS | Performed by: INTERNAL MEDICINE

## 2023-01-23 PROCEDURE — 85520 HEPARIN ASSAY: CPT | Performed by: INTERNAL MEDICINE

## 2023-01-23 PROCEDURE — 93454 CORONARY ARTERY ANGIO S&I: CPT | Mod: 26 | Performed by: INTERNAL MEDICINE

## 2023-01-23 PROCEDURE — 99231 SBSQ HOSP IP/OBS SF/LOW 25: CPT | Performed by: INTERNAL MEDICINE

## 2023-01-23 PROCEDURE — B2111ZZ FLUOROSCOPY OF MULTIPLE CORONARY ARTERIES USING LOW OSMOLAR CONTRAST: ICD-10-PCS | Performed by: INTERNAL MEDICINE

## 2023-01-23 PROCEDURE — C1753 CATH, INTRAVAS ULTRASOUND: HCPCS | Performed by: INTERNAL MEDICINE

## 2023-01-23 PROCEDURE — 027034Z DILATION OF CORONARY ARTERY, ONE ARTERY WITH DRUG-ELUTING INTRALUMINAL DEVICE, PERCUTANEOUS APPROACH: ICD-10-PCS | Performed by: INTERNAL MEDICINE

## 2023-01-23 PROCEDURE — 85347 COAGULATION TIME ACTIVATED: CPT

## 2023-01-23 DEVICE — STENT CORONARY DES SYNERGY XD MR US 3.00X20MM H7493941820300: Type: IMPLANTABLE DEVICE | Status: FUNCTIONAL

## 2023-01-23 RX ORDER — ACETAMINOPHEN 325 MG/1
650 TABLET ORAL EVERY 4 HOURS PRN
Status: DISCONTINUED | OUTPATIENT
Start: 2023-01-23 | End: 2023-01-24 | Stop reason: HOSPADM

## 2023-01-23 RX ORDER — HYDRALAZINE HYDROCHLORIDE 20 MG/ML
10 INJECTION INTRAMUSCULAR; INTRAVENOUS EVERY 4 HOURS PRN
Status: DISCONTINUED | OUTPATIENT
Start: 2023-01-23 | End: 2023-01-24 | Stop reason: HOSPADM

## 2023-01-23 RX ORDER — FENTANYL CITRATE 50 UG/ML
INJECTION, SOLUTION INTRAMUSCULAR; INTRAVENOUS
Status: DISCONTINUED | OUTPATIENT
Start: 2023-01-23 | End: 2023-01-23 | Stop reason: HOSPADM

## 2023-01-23 RX ORDER — LORAZEPAM 2 MG/ML
0.5 INJECTION INTRAMUSCULAR
Status: DISCONTINUED | OUTPATIENT
Start: 2023-01-23 | End: 2023-01-23 | Stop reason: HOSPADM

## 2023-01-23 RX ORDER — NALOXONE HYDROCHLORIDE 0.4 MG/ML
0.4 INJECTION, SOLUTION INTRAMUSCULAR; INTRAVENOUS; SUBCUTANEOUS
Status: DISCONTINUED | OUTPATIENT
Start: 2023-01-23 | End: 2023-01-23

## 2023-01-23 RX ORDER — NALOXONE HYDROCHLORIDE 0.4 MG/ML
0.2 INJECTION, SOLUTION INTRAMUSCULAR; INTRAVENOUS; SUBCUTANEOUS
Status: DISCONTINUED | OUTPATIENT
Start: 2023-01-23 | End: 2023-01-23

## 2023-01-23 RX ORDER — PRASUGREL 10 MG/1
10 TABLET, FILM COATED ORAL DAILY
Status: DISCONTINUED | OUTPATIENT
Start: 2023-01-24 | End: 2023-01-24 | Stop reason: HOSPADM

## 2023-01-23 RX ORDER — ONDANSETRON 2 MG/ML
4 INJECTION INTRAMUSCULAR; INTRAVENOUS EVERY 6 HOURS PRN
Status: DISCONTINUED | OUTPATIENT
Start: 2023-01-23 | End: 2023-01-24 | Stop reason: HOSPADM

## 2023-01-23 RX ORDER — POTASSIUM CHLORIDE 1500 MG/1
20 TABLET, EXTENDED RELEASE ORAL
Status: DISCONTINUED | OUTPATIENT
Start: 2023-01-23 | End: 2023-01-23 | Stop reason: HOSPADM

## 2023-01-23 RX ORDER — LIDOCAINE 40 MG/G
CREAM TOPICAL
Status: DISCONTINUED | OUTPATIENT
Start: 2023-01-23 | End: 2023-01-23

## 2023-01-23 RX ORDER — PRASUGREL 10 MG/1
60 TABLET, FILM COATED ORAL ONCE
Status: COMPLETED | OUTPATIENT
Start: 2023-01-23 | End: 2023-01-23

## 2023-01-23 RX ORDER — METOPROLOL TARTRATE 1 MG/ML
5 INJECTION, SOLUTION INTRAVENOUS
Status: DISCONTINUED | OUTPATIENT
Start: 2023-01-23 | End: 2023-01-24

## 2023-01-23 RX ORDER — LORAZEPAM 0.5 MG/1
0.5 TABLET ORAL
Status: DISCONTINUED | OUTPATIENT
Start: 2023-01-23 | End: 2023-01-23 | Stop reason: HOSPADM

## 2023-01-23 RX ORDER — FLUMAZENIL 0.1 MG/ML
0.2 INJECTION, SOLUTION INTRAVENOUS
Status: ACTIVE | OUTPATIENT
Start: 2023-01-23 | End: 2023-01-23

## 2023-01-23 RX ORDER — IOPAMIDOL 755 MG/ML
INJECTION, SOLUTION INTRAVASCULAR
Status: DISCONTINUED | OUTPATIENT
Start: 2023-01-23 | End: 2023-01-23 | Stop reason: HOSPADM

## 2023-01-23 RX ORDER — NITROGLYCERIN 0.4 MG/1
0.4 TABLET SUBLINGUAL EVERY 5 MIN PRN
Status: DISCONTINUED | OUTPATIENT
Start: 2023-01-23 | End: 2023-01-24 | Stop reason: HOSPADM

## 2023-01-23 RX ORDER — ATROPINE SULFATE 0.1 MG/ML
0.5 INJECTION INTRAVENOUS
Status: ACTIVE | OUTPATIENT
Start: 2023-01-23 | End: 2023-01-23

## 2023-01-23 RX ORDER — ASPIRIN 81 MG/1
243 TABLET, CHEWABLE ORAL ONCE
Status: COMPLETED | OUTPATIENT
Start: 2023-01-23 | End: 2023-01-23

## 2023-01-23 RX ORDER — SODIUM CHLORIDE 9 MG/ML
INJECTION, SOLUTION INTRAVENOUS CONTINUOUS
Status: ACTIVE | OUTPATIENT
Start: 2023-01-23 | End: 2023-01-23

## 2023-01-23 RX ORDER — VERAPAMIL HYDROCHLORIDE 2.5 MG/ML
INJECTION, SOLUTION INTRAVENOUS
Status: DISCONTINUED | OUTPATIENT
Start: 2023-01-23 | End: 2023-01-23 | Stop reason: HOSPADM

## 2023-01-23 RX ORDER — NITROGLYCERIN 5 MG/ML
VIAL (ML) INTRAVENOUS
Status: DISCONTINUED | OUTPATIENT
Start: 2023-01-23 | End: 2023-01-23 | Stop reason: HOSPADM

## 2023-01-23 RX ORDER — ASPIRIN 325 MG
325 TABLET ORAL ONCE
Status: COMPLETED | OUTPATIENT
Start: 2023-01-23 | End: 2023-01-23

## 2023-01-23 RX ORDER — ONDANSETRON 4 MG/1
4 TABLET, ORALLY DISINTEGRATING ORAL EVERY 6 HOURS PRN
Status: DISCONTINUED | OUTPATIENT
Start: 2023-01-23 | End: 2023-01-24 | Stop reason: HOSPADM

## 2023-01-23 RX ORDER — HEPARIN SODIUM 1000 [USP'U]/ML
INJECTION, SOLUTION INTRAVENOUS; SUBCUTANEOUS
Status: DISCONTINUED | OUTPATIENT
Start: 2023-01-23 | End: 2023-01-23 | Stop reason: HOSPADM

## 2023-01-23 RX ORDER — FENTANYL CITRATE 50 UG/ML
25 INJECTION, SOLUTION INTRAMUSCULAR; INTRAVENOUS
Status: DISCONTINUED | OUTPATIENT
Start: 2023-01-23 | End: 2023-01-24 | Stop reason: HOSPADM

## 2023-01-23 RX ORDER — ASPIRIN 81 MG/1
81 TABLET, CHEWABLE ORAL DAILY
Qty: 30 TABLET | Refills: 3 | Status: SHIPPED | OUTPATIENT
Start: 2023-01-24 | End: 2023-01-24

## 2023-01-23 RX ORDER — SODIUM CHLORIDE 9 MG/ML
INJECTION, SOLUTION INTRAVENOUS CONTINUOUS
Status: DISCONTINUED | OUTPATIENT
Start: 2023-01-23 | End: 2023-01-23 | Stop reason: HOSPADM

## 2023-01-23 RX ADMIN — ROSUVASTATIN CALCIUM 20 MG: 20 TABLET, FILM COATED ORAL at 08:08

## 2023-01-23 RX ADMIN — OXYCODONE HYDROCHLORIDE AND ACETAMINOPHEN 1 TABLET: 5; 325 TABLET ORAL at 22:52

## 2023-01-23 RX ADMIN — ASPIRIN 81 MG: 81 TABLET, COATED ORAL at 08:08

## 2023-01-23 RX ADMIN — OXYCODONE HYDROCHLORIDE AND ACETAMINOPHEN 1 TABLET: 5; 325 TABLET ORAL at 18:21

## 2023-01-23 RX ADMIN — METOPROLOL TARTRATE 25 MG: 25 TABLET, FILM COATED ORAL at 22:52

## 2023-01-23 RX ADMIN — SODIUM CHLORIDE 75 ML/HR: 9 INJECTION, SOLUTION INTRAVENOUS at 13:56

## 2023-01-23 RX ADMIN — METOPROLOL TARTRATE 25 MG: 25 TABLET, FILM COATED ORAL at 08:08

## 2023-01-23 RX ADMIN — SODIUM CHLORIDE 150 ML/HR: 9 INJECTION, SOLUTION INTRAVENOUS at 10:00

## 2023-01-23 RX ADMIN — OXYCODONE HYDROCHLORIDE AND ACETAMINOPHEN 1 TABLET: 5; 325 TABLET ORAL at 14:03

## 2023-01-23 RX ADMIN — ASPIRIN 243 MG: 81 TABLET, CHEWABLE ORAL at 10:05

## 2023-01-23 RX ADMIN — PRASUGREL HYDROCHLORIDE 60 MG: 10 TABLET, FILM COATED ORAL at 13:13

## 2023-01-23 RX ADMIN — OXYCODONE HYDROCHLORIDE AND ACETAMINOPHEN 1 TABLET: 5; 325 TABLET ORAL at 08:08

## 2023-01-23 ASSESSMENT — ACTIVITIES OF DAILY LIVING (ADL)
ADLS_ACUITY_SCORE: 35

## 2023-01-23 NOTE — PLAN OF CARE
Goal Outcome Evaluation:      Plan of Care Reviewed With: patient      Patient had angiogram one stent placed, right radial site is ecchymotic, VSS, RA. Independent in the room. Tolerated food. Pain controlled with pain medications.

## 2023-01-23 NOTE — PROGRESS NOTES
[facility]    Hospitalist Progress Note    Interval History   - Angiogram today, no chest pain  - Discharge pending angiogram results    Assessment & Plan   Summary: Linda Guadalupe is a 62 year old female with PMH tobacco use disorder who was admitted on 2023 with NSTEMI.    NSTEMI  Ischemic cardiomyopathy  Patient with one week of malaise and one day of chest pain. On admission noted elevated troponin. TTE shows decreased EF 40-45% and inferior hypokinesis. Note significant family hx father  from MI at 49.  - Appreciate Cardiology consult   - Plan angiogram    - Metoprolol   - Heparin drip   - Rosuvastatin   - ASA    Tobacco use disorder: Patient reports she will quit cold turkey without nicotine aids.    HLD: Statin as above    DVT Prophylaxis: Heparin drip  Code Status: Full Code  PT/OT: not needed  Diet: NPO per Anesthesia Guidelines for Procedure/Surgery Except for: Meds  NPO for Medical/Clinical Reasons Except for: Meds      Disposition: Expected discharge 2023    Bonilla Puri MD, MD  Text Page  (7am to 6pm)  -Data reviewed today: I reviewed all new labs and imaging results over the last 24 hours.    Physical Exam   Temp: 98  F (36.7  C) Temp src: Oral BP: 102/63 Pulse: 58   Resp: 20 SpO2: 98 % O2 Device: None (Room air)    Vitals:    23 1528 23 2120 23 0633   Weight: 50.2 kg (110 lb 9.6 oz) 50.6 kg (111 lb 9.6 oz) 51 kg (112 lb 6.4 oz)     Vital Signs with Ranges  Temp:  [97.7  F (36.5  C)-98  F (36.7  C)] 98  F (36.7  C)  Pulse:  [50-66] 58  Resp:  [20-22] 20  BP: ()/(59-63) 102/63  SpO2:  [98 %-99 %] 98 %  No intake/output data recorded.  O2 requirements: none    Constitutional: Thin female in NAD  HEENT: Eyes nonicteric, oral mucosa moist  Cardiovascular: RRR, normal S1/2, no m/r/g  Respiratory: CTAB, no wheezing or crackles  Vascular: No LE pitting edema  GI: Normoactive bowel sounds, nontender, nondistended  Skin/Integumen: No  rashes  Neuro/Psych: Appropriate affect and mood. A&Ox3, moves all extremities    Medications     heparin 1,000 Units/hr (01/23/23 0742)     - MEDICATION INSTRUCTIONS -       sodium chloride 150 mL/hr (01/23/23 1000)       aspirin  81 mg Oral Daily     metoprolol tartrate  25 mg Oral BID     rosuvastatin  20 mg Oral Daily     sodium chloride (PF)  3 mL Intracatheter Q8H     sodium chloride (PF)  3 mL Intracatheter Q8H       Data   Recent Labs   Lab 01/23/23  0631 01/22/23  1124 01/20/23  1046   WBC 6.8 6.9 8.6   HGB 14.0 13.7 16.0*   MCV 95 93 93    266 396   NA  --  135* 136   POTASSIUM  --  4.4 4.7   CHLORIDE  --  102 99   CO2  --  26 24   BUN  --  14.5 8.3   CR  --  0.70 0.81   ANIONGAP  --  7 13   VETO  --  9.3 9.9   GLC  --  102* 171*   ALBUMIN  --   --  5.2   PROTTOTAL  --   --  7.8   BILITOTAL  --   --  0.6   ALKPHOS  --   --  88   ALT  --   --  22   AST  --   --  32       Imaging:   No results found for this or any previous visit (from the past 24 hour(s)).

## 2023-01-23 NOTE — PLAN OF CARE
[id number]  Alert and oriented times four  Independent  Percocet for back pain  Denies chest pain/SOB  Heparin at 850 units/hr  SR  NPO  Plan: Continue to monitor and angio

## 2023-01-23 NOTE — PROGRESS NOTES
"HOSPITALIST SERVICE PROGRESS NOTE      Consult paged to Hospitalist service for \"medical management of non-cardiac medical problems, discharge planning,\" with requested consult by Dr. Jamison.    PLAN:  -Consult discontinued  -Hospitalist service is already the primary service managing and following the patient during this hospitalization  -Appreciate Cardiology consultation and recommendations      Maggy Ervin NP  [facility]  Securely message with the [employer]oc[employer] Web Console (learn more here)  Text page via [employer] Paging/Directory    "

## 2023-01-23 NOTE — PROGRESS NOTES
[facility]  Cardiology Progress Note    Date of Service (when I saw the patient): 2023  Summary: Linda Guadalupe is a 62 year old female with history of chronic back pain, tobacco use, hyperlipidemia, and family history of CAD (father  of MI at 49) who was admitted on 2023 with a brief episode of chest discomfort. She was found to have an elevated troponin and a new cardiomyopathy.   Interval History   No chest pain or shortness of breath. Angiogram planned today. No dizziness or lightheadedness. BPs 90 - 100s systolic.   Assessment & Plan    1.  NSTEMI. Admitted with brief episode of chest pain and shortness of breath. HS troponin initially 10 - peaked at 114. Elevated troponin possibly due to severe hypertension on admission however given echo with new LV dysfunction and WMAs, concern for type I NSTEMI. Coronary angiography recommended.  -  Initiated on IV heparin on admission.   -  On aspirin, statin, beta blocker.   2.  Suspected ischemic cardiomyopathy. Echo with mild to moderate LV dysfunction with EF 40 - 45% and moderate to severe hypokinesis of the mid anterior and anteroseptal walls and all apical segments. Normal RV function and no significant valvular disease.  -  On Metoprolol.  3.  Ongoing tobacco use. Cessation encouraged.  4.  Hyperlipidemia. . Started on Crestor 20 mg daily.     Plan:  1.  Coronary angiogram today. Risks and benefits of the procedure discussed. She is agreeable to proceed. Consent signed.  2.  Continue IV heparin, aspirin, statin, and beta blocker.  3.  If BP allows post cath, will add low dose ACE. BP marginal at baseline.  4.  Needs risk factor modification including smoking cessation.  5.  Further recommendations pending coronary angiogram results.     Alayna Avina PA-C    Physical Exam   Temp: 98  F (36.7  C) Temp src: Oral BP: 102/63 Pulse: 58   Resp: 21 SpO2: 98 % O2 Device: None (Room air)    Vitals:    23 1528 23  2120 01/23/23 0633   Weight: 50.2 kg (110 lb 9.6 oz) 50.6 kg (111 lb 9.6 oz) 51 kg (112 lb 6.4 oz)     Vital Signs with Ranges  Temp:  [97.7  F (36.5  C)-98  F (36.7  C)] 98  F (36.7  C)  Pulse:  [50-66] 58  Resp:  [20-22] 21  BP: ()/(59-63) 102/63  SpO2:  [96 %-99 %] 98 %  No intake/output data recorded.  Constitutional: NAD.   Respiratory: CTAB.   Cardiovascular: RRR, s1s2, no murmur  GI: soft, BS+  Skin: warm, no rashes  Musculoskeletal: Moving all extremities  Neurologic: Alert, oriented x 3  Neuropsychiatric: Normal affect   Data   Results for orders placed or performed during the hospital encounter of 01/20/23 (from the past 24 hour(s))   Heparin Unfractionated Anti Xa Level   Result Value Ref Range    Anti Xa Unfractionated Heparin 0.37 For Reference Range, See Comment IU/mL    Narrative    Therapeutic Range: UFH: 0.25-0.50 IU/mL for low intensity dosing,  0.30-0.70 IU/mL for high intensity dosing DVT and PE.  This test is not validated for other direct factor X inhibitors (e.g. rivaroxaban, apixaban, edoxaban, betrixaban, fondaparinux) and should not be used for monitoring of other medications.   Basic metabolic panel   Result Value Ref Range    Sodium 135 (L) 136 - 145 mmol/L    Potassium 4.4 3.4 - 5.3 mmol/L    Chloride 102 98 - 107 mmol/L    Carbon Dioxide (CO2) 26 22 - 29 mmol/L    Anion Gap 7 7 - 15 mmol/L    Urea Nitrogen 14.5 8.0 - 23.0 mg/dL    Creatinine 0.70 0.51 - 0.95 mg/dL    Calcium 9.3 8.8 - 10.2 mg/dL    Glucose 102 (H) 70 - 99 mg/dL    GFR Estimate >90 >60 mL/min/1.73m2   CBC with platelets   Result Value Ref Range    WBC Count 6.9 4.0 - 11.0 10e3/uL    RBC Count 4.20 3.80 - 5.20 10e6/uL    Hemoglobin 13.7 11.7 - 15.7 g/dL    Hematocrit 39.0 35.0 - 47.0 %    MCV 93 78 - 100 fL    MCH 32.6 26.5 - 33.0 pg    MCHC 35.1 31.5 - 36.5 g/dL    RDW 12.3 10.0 - 15.0 %    Platelet Count 266 150 - 450 10e3/uL   Heparin Unfractionated Anti Xa Level   Result Value Ref Range    Anti Xa Unfractionated  Heparin 0.40 For Reference Range, See Comment IU/mL    Narrative    Therapeutic Range: UFH: 0.25-0.50 IU/mL for low intensity dosing,  0.30-0.70 IU/mL for high intensity dosing DVT and PE.  This test is not validated for other direct factor X inhibitors (e.g. rivaroxaban, apixaban, edoxaban, betrixaban, fondaparinux) and should not be used for monitoring of other medications.   Heparin Unfractionated Anti Xa Level   Result Value Ref Range    Anti Xa Unfractionated Heparin 0.20 For Reference Range, See Comment IU/mL    Narrative    Therapeutic Range: UFH: 0.25-0.50 IU/mL for low intensity dosing,  0.30-0.70 IU/mL for high intensity dosing DVT and PE.  This test is not validated for other direct factor X inhibitors (e.g. rivaroxaban, apixaban, edoxaban, betrixaban, fondaparinux) and should not be used for monitoring of other medications.   CBC with platelets   Result Value Ref Range    WBC Count 6.8 4.0 - 11.0 10e3/uL    RBC Count 4.43 3.80 - 5.20 10e6/uL    Hemoglobin 14.0 11.7 - 15.7 g/dL    Hematocrit 42.0 35.0 - 47.0 %    MCV 95 78 - 100 fL    MCH 31.6 26.5 - 33.0 pg    MCHC 33.3 31.5 - 36.5 g/dL    RDW 12.2 10.0 - 15.0 %    Platelet Count 244 150 - 450 10e3/uL      Tele SR/SB    Medications     heparin 1,000 Units/hr (01/23/23 0742)       aspirin  81 mg Oral Daily     metoprolol tartrate  25 mg Oral BID     rosuvastatin  20 mg Oral Daily     sodium chloride (PF)  3 mL Intracatheter Q8H

## 2023-01-23 NOTE — PRE-PROCEDURE
GENERAL PRE-PROCEDURE:     Written consent obtained?: Yes    Risks and benefits: Risks, benefits and alternatives were discussed    Consent given by:  Patient  Patient states understanding of procedure being performed: Yes    Patient's understanding of procedure matches consent: Yes    Procedure consent matches procedure scheduled: Yes    Expected level of sedation:  Moderate  Appropriately NPO:  Yes  ASA Class:  3  Mallampati  :  Grade 3- soft palate visible, posterior pharyngeal wall not visible  Lungs:  Lungs clear with good breath sounds bilaterally  Heart:  Normal heart sounds and rate  History & Physical reviewed:  History and physical reviewed and no updates needed  Statement of review:  I have reviewed the lab findings, diagnostic data, medications, and the plan for sedation

## 2023-01-24 ENCOUNTER — APPOINTMENT (OUTPATIENT)
Dept: PHYSICAL THERAPY | Facility: CLINIC | Age: 63
End: 2023-01-24
Attending: INTERNAL MEDICINE
Payer: COMMERCIAL

## 2023-01-24 VITALS
BODY MASS INDEX: 18 KG/M2 | RESPIRATION RATE: 18 BRPM | DIASTOLIC BLOOD PRESSURE: 76 MMHG | OXYGEN SATURATION: 96 % | HEART RATE: 75 BPM | HEIGHT: 66 IN | TEMPERATURE: 97.9 F | WEIGHT: 112 LBS | SYSTOLIC BLOOD PRESSURE: 126 MMHG

## 2023-01-24 LAB
ATRIAL RATE - MUSE: 51 BPM
ATRIAL RATE - MUSE: 53 BPM
ATRIAL RATE - MUSE: 61 BPM
ATRIAL RATE - MUSE: 62 BPM
DIASTOLIC BLOOD PRESSURE - MUSE: NORMAL MMHG
INTERPRETATION ECG - MUSE: NORMAL
P AXIS - MUSE: 107 DEGREES
P AXIS - MUSE: 60 DEGREES
P AXIS - MUSE: 65 DEGREES
P AXIS - MUSE: 70 DEGREES
PR INTERVAL - MUSE: 130 MS
PR INTERVAL - MUSE: 134 MS
PR INTERVAL - MUSE: 142 MS
PR INTERVAL - MUSE: 158 MS
QRS DURATION - MUSE: 104 MS
QRS DURATION - MUSE: 110 MS
QRS DURATION - MUSE: 110 MS
QRS DURATION - MUSE: 114 MS
QT - MUSE: 500 MS
QT - MUSE: 588 MS
QT - MUSE: 634 MS
QT - MUSE: 654 MS
QTC - MUSE: 503 MS
QTC - MUSE: 551 MS
QTC - MUSE: 602 MS
QTC - MUSE: 643 MS
R AXIS - MUSE: -29 DEGREES
R AXIS - MUSE: -44 DEGREES
R AXIS - MUSE: -51 DEGREES
R AXIS - MUSE: -79 DEGREES
SYSTOLIC BLOOD PRESSURE - MUSE: NORMAL MMHG
T AXIS - MUSE: -87 DEGREES
T AXIS - MUSE: -89 DEGREES
T AXIS - MUSE: 266 DEGREES
T AXIS - MUSE: 269 DEGREES
VENTRICULAR RATE- MUSE: 51 BPM
VENTRICULAR RATE- MUSE: 53 BPM
VENTRICULAR RATE- MUSE: 61 BPM
VENTRICULAR RATE- MUSE: 62 BPM

## 2023-01-24 PROCEDURE — 97161 PT EVAL LOW COMPLEX 20 MIN: CPT | Mod: GP

## 2023-01-24 PROCEDURE — 97110 THERAPEUTIC EXERCISES: CPT | Mod: GP

## 2023-01-24 PROCEDURE — 250N000013 HC RX MED GY IP 250 OP 250 PS 637: Performed by: INTERNAL MEDICINE

## 2023-01-24 PROCEDURE — 93010 ELECTROCARDIOGRAM REPORT: CPT | Performed by: INTERNAL MEDICINE

## 2023-01-24 PROCEDURE — 99239 HOSP IP/OBS DSCHRG MGMT >30: CPT | Performed by: INTERNAL MEDICINE

## 2023-01-24 PROCEDURE — 93005 ELECTROCARDIOGRAM TRACING: CPT

## 2023-01-24 PROCEDURE — 97530 THERAPEUTIC ACTIVITIES: CPT | Mod: GP

## 2023-01-24 PROCEDURE — 250N000013 HC RX MED GY IP 250 OP 250 PS 637: Performed by: STUDENT IN AN ORGANIZED HEALTH CARE EDUCATION/TRAINING PROGRAM

## 2023-01-24 PROCEDURE — 99233 SBSQ HOSP IP/OBS HIGH 50: CPT | Mod: FS | Performed by: PHYSICIAN ASSISTANT

## 2023-01-24 RX ORDER — METOPROLOL TARTRATE 25 MG/1
12.5 TABLET, FILM COATED ORAL 2 TIMES DAILY
Qty: 90 TABLET | Refills: 0 | Status: SHIPPED | OUTPATIENT
Start: 2023-01-24 | End: 2023-02-13

## 2023-01-24 RX ORDER — ROSUVASTATIN CALCIUM 20 MG/1
20 TABLET, COATED ORAL DAILY
Qty: 90 TABLET | Refills: 0 | Status: SHIPPED | OUTPATIENT
Start: 2023-01-24 | End: 2023-02-13

## 2023-01-24 RX ORDER — PRASUGREL 10 MG/1
10 TABLET, FILM COATED ORAL DAILY
Qty: 60 TABLET | Refills: 3 | Status: SHIPPED | OUTPATIENT
Start: 2023-01-24 | End: 2023-02-13

## 2023-01-24 RX ORDER — LISINOPRIL 2.5 MG/1
2.5 TABLET ORAL DAILY
Qty: 90 TABLET | Refills: 0 | Status: SHIPPED | OUTPATIENT
Start: 2023-01-25 | End: 2023-02-13

## 2023-01-24 RX ORDER — LISINOPRIL 2.5 MG/1
2.5 TABLET ORAL DAILY
Status: DISCONTINUED | OUTPATIENT
Start: 2023-01-24 | End: 2023-01-24 | Stop reason: HOSPADM

## 2023-01-24 RX ADMIN — ROSUVASTATIN CALCIUM 20 MG: 20 TABLET, FILM COATED ORAL at 07:57

## 2023-01-24 RX ADMIN — PRASUGREL 10 MG: 10 TABLET, FILM COATED ORAL at 07:57

## 2023-01-24 RX ADMIN — METOPROLOL TARTRATE 25 MG: 25 TABLET, FILM COATED ORAL at 07:57

## 2023-01-24 RX ADMIN — ASPIRIN 81 MG: 81 TABLET, COATED ORAL at 07:57

## 2023-01-24 RX ADMIN — OXYCODONE HYDROCHLORIDE AND ACETAMINOPHEN 1 TABLET: 5; 325 TABLET ORAL at 06:53

## 2023-01-24 RX ADMIN — LISINOPRIL 2.5 MG: 2.5 TABLET ORAL at 08:25

## 2023-01-24 ASSESSMENT — ACTIVITIES OF DAILY LIVING (ADL)
ADLS_ACUITY_SCORE: 35

## 2023-01-24 NOTE — DISCHARGE SUMMARY
[facility]  Discharge Summary        Linda Guadalupe MRN# [id number]   YOB: 1960 Age: 62 year old     Date of Admission:  2023  Date of Discharge:  2023  Admitting Physician:  Vanessa Herrera MD  Discharge Physician: Bhumi Larry MD  Discharging Service: Hospitalist     Primary Provider: Shannen Rodas  Primary Care Physician Phone Number: None         Discharge Diagnoses/Problem Oriented Hospital Course (Providers):    Linda Guadalupe was admitted on 2023 by Vanessa Herrera MD and I would refer you to their history and physical.  The following problems were addressed during her hospitalization:    Summary: Linda Guadalupe is a 62 year old female with PMH tobacco use disorder who was admitted on 2023 with NSTEMI.     NSTEMI S/p Stent to LAD   Ischemic cardiomyopathy  Patient with one week of malaise and one day of chest pain. On admission noted elevated troponin. TTE shows decreased EF 40-45% and inferior hypokinesis. Note significant family hx father  from MI at 49.  - Appreciate Cardiology consult  Underwent coronary angiography on 22 that showed an 80% proximal LAD stenosis which was treated with a JOYCE. She had mild nonobstructive disease elsewhere.      Continue DAPT with aspirin and effient to be continued for one year.    Continue crestor 20 mg daily. Will need f/u lipid panel in clinic. Can increase Crestor if LDL not at goal.     Continue metoprolol, dose decreased to 12.5 mg BID due to bradycardia.   .  Continue low dose lisinopril at 2.5 mg daily.    Will need follow up echo to reassess EF and wall motion abnormalities as an outpatient.          -               Tobacco use disorder: Patient reports she will quit cold turkey without nicotine aids.     HLD: Statin as above     DVT Prophylaxis: aspirin and prasugrael  Code Status: Full Code  PT/OT: not needed  Diet: Low salt, low fat diet      Disposition: Expected discharge 2023 in  stable condition with family     Bhumi Larry MD   Pager [phone](7AM-6PM)             Code Status:      Full Code        Brief Hospital Stay Summary Sent Home With Patient in AVS:        Reason for your hospital stay      NONSTEMI S/p stent to LAD                 Important Results:      Please see below         Pending Results:        Unresulted Labs Ordered in the Past 30 Days of this Admission     No orders found from 12/21/2022 to 1/21/2023.            Discharge Instructions and Follow-Up:      Follow-up Appointments     Follow-up and recommended labs and tests       F/u with cardiology in 2weeks               Discharge Disposition:      Discharged to home        Discharge Medications:        Discharge Medication List as of 1/24/2023 10:00 AM      START taking these medications    Details   aspirin (ASA) 81 MG EC tablet Take 1 tablet (81 mg) by mouth daily, Disp-100 tablet, R-1, E-Prescribe      lisinopril (ZESTRIL) 2.5 MG tablet Take 1 tablet (2.5 mg) by mouth daily, Disp-90 tablet, R-0, E-Prescribe      metoprolol tartrate (LOPRESSOR) 25 MG tablet Take 0.5 tablets (12.5 mg) by mouth 2 times daily, Disp-90 tablet, R-0, E-Prescribe      prasugrel (EFFIENT) 10 MG TABS tablet Take 1 tablet (10 mg) by mouth daily, Disp-60 tablet, R-3, E-Prescribe      rosuvastatin (CRESTOR) 20 MG tablet Take 1 tablet (20 mg) by mouth daily, Disp-90 tablet, R-0, E-Prescribe         CONTINUE these medications which have NOT CHANGED    Details   albuterol (PROAIR HFA/PROVENTIL HFA/VENTOLIN HFA) 108 (90 Base) MCG/ACT inhaler Inhale 1-2 puffs into the lungs every 4 hours as needed for shortness of breath or wheezing, Historical      oxyCODONE-acetaminophen (PERCOCET) 5-325 MG per tablet Take 1-2 tablets by mouth every 6 hours as needed for moderate to severe pain, Disp-16 tablet, R-0, Normal         STOP taking these medications       ibuprofen (ADVIL,MOTRIN) 800 MG tablet Comments:   Reason for Stopping:                 Allergies:   "     No Known Allergies        Consultations This Hospital Stay:      Consultation during this admission received from cardiology        Condition and Physical on Discharge:      Discharge condition: Stable   Vitals: Blood pressure 126/76, pulse 75, temperature 97.9  F (36.6  C), temperature source Oral, resp. rate 18, height 1.676 m (5' 5.98\"), weight 50.8 kg (112 lb), SpO2 96 %, not currently breastfeeding.   Constitutional: Thin female in NAD  HEENT: Eyes nonicteric, oral mucosa moist  Cardiovascular: RRR, normal S1/2, no m/r/g  Respiratory: CTAB, no wheezing or crackles  Vascular: No LE pitting edema  GI: Normoactive bowel sounds, nontender, nondistended  Skin/Integumen: No rashes  Neuro/Psych: Appropriate affect and mood. A&Ox3, moves all extremities           Discharge Time:      Greater than 30 minutes.        Image Results From This Hospital Stay (For Non-EPIC Providers):        Results for orders placed or performed during the hospital encounter of 23   XR Chest 2 Views    Narrative    CHEST TWO VIEWS 2023 11:02 AM     HISTORY: Shortness of breath.    COMPARISON: May 11, 2016       Impression    IMPRESSION: There are no acute infiltrates. The cardiac silhouette is  not enlarged. Pulmonary vasculature is unremarkable.    CLOTILDE LOO MD         SYSTEM ID:  [id number]   Echocardiogram Complete     Value    LVEF  40-45%    Narrative    [id number]  [id number]  [id number]  [id number]^CIERRA^CLAIR     [facility]  Echocardiography Laboratory  [address] [place] [address]     Name: SERA MCGHEE  MRN: [id number]  : 1960  Study Date: 2023 07:35 AM  Age: 62 yrs  Gender: Female  Patient Location: [facility]  Reason For Study: CAD  Ordering Physician: CLAIR ONRTON  Referring Physician: Shannen Rodas  Performed By: Wolf Salgado     BSA: 1.6 m2  Height: 66 in  Weight: 110 lb  HR: 57  BP: 105/90 " mmHg  ______________________________________________________________________________  Procedure  Complete Portable Echo Adult. Optison (NDC #[id number]) given intravenously.  ______________________________________________________________________________  Interpretation Summary     1. The left ventricle is normal in size. There is normal left ventricular wall  thickness. Left ventricular systolic function is mild to moderately reduced.  The visual ejection fraction is 40-45%. Left ventricular diastolic function is  abnormal. There is moderate to severe hypokinesis of the mid anterior and  anteroseptal walls and all apical segments of the left ventricle. There is no  thrombus seen in the left ventricle.  2. Normal right ventricular size and systolic function.  3. Trace to mild mitral and tricuspid regurgitation.  4. No pericardial effusion.  5. No previous study for comparison.  ______________________________________________________________________________  Left Ventricle  The left ventricle is normal in size. There is normal left ventricular wall  thickness. Left ventricular systolic function is mild to moderately reduced.  The visual ejection fraction is 40-45%. Left ventricular diastolic function is  abnormal. There is moderate to severe hypokinesis of the mid anterior and  anteroseptal walls and all apical segments of the left ventricle. There is no  thrombus seen in the left ventricle.     Right Ventricle  The right ventricle is normal size. The right ventricular systolic function is  normal.     Atria  Normal left atrial size. Right atrial size is normal. There is no color  Doppler evidence of an atrial shunt.     Mitral Valve  There is trace mitral regurgitation.     Tricuspid Valve  There is mild (1+) tricuspid regurgitation. The right ventricular systolic  pressure is approximated at 26.0 mmHg plus the right atrial pressure.     Aortic Valve  The aortic valve is not well visualized. No aortic regurgitation is  present.  No aortic stenosis is present.     Pulmonic Valve  There is no pulmonic valvular stenosis.     Vessels  The aortic root is normal size. Normal size ascending aorta. The inferior vena  cava is normal.     Pericardium  There is no pericardial effusion.     Rhythm  Sinus rhythm was noted.  ______________________________________________________________________________  MMode/2D Measurements & Calculations  IVSd: 0.83 cm     LVIDd: 4.8 cm  LVIDs: 3.2 cm  LVPWd: 0.89 cm  FS: 33.8 %  LV mass(C)d: 140.7 grams  LV mass(C)dI: 90.7 grams/m2  Ao root diam: 3.4 cm  LA dimension: 2.8 cm  asc Aorta Diam: 2.9 cm  LA/Ao: 0.80  LVOT diam: 2.3 cm  LVOT area: 4.2 cm2  RWT: 0.37     Doppler Measurements & Calculations  MV E max rachel: 74.7 cm/sec  MV A max rachel: 87.3 cm/sec  MV E/A: 0.86  MV max P.4 mmHg  MV mean P.6 mmHg  MV V2 VTI: 21.1 cm  MV dec slope: 196.4 cm/sec2  MV dec time: 0.38 sec  PA acc time: 0.18 sec  TR max rachel: 255.2 cm/sec  TR max P.0 mmHg  E/E' avg: 15.7  Lateral E/e': 16.7     Medial E/e': 14.6     ______________________________________________________________________________  Report approved by: Willy Girard 2023 10:34 AM         Cardiac Catheterization    Narrative    1.  Single-vessel obstructive CAD (80% proximal LAD stenosis)  2.  Mild, nonobstructive CAD elsewhere   3.  Successful HD IVUS guided PCI of the proximal LAD with placement of a   single 3.0 x 20 mm Synergy JOYCE, postdilated with a 3.5 mm NC balloon           Most Recent Lab Results In EPIC (For Non-EPIC Providers):    Most Recent 3 CBC's:  Recent Labs   Lab Test 23  0631 23  1124 23  1046   WBC 6.8 6.9 8.6   HGB 14.0 13.7 16.0*   MCV 95 93 93    266 396      Most Recent 3 BMP's:  Recent Labs   Lab Test 23  1124 23  1046 16  0042   * 136 136   POTASSIUM 4.4 4.7 3.6   CHLORIDE 102 99 101   CO2 26 24 30   BUN 14.5 8.3 9   CR 0.70 0.81 0.63   ANIONGAP 7 13 5   VETO 9.3 9.9 9.1    * 171* 107*     Most Recent 3 Troponin's:No lab results found.    Invalid input(s): TROP, TROPONINIES  Most Recent 3 INR's:No lab results found.  Most Recent 2 LFT's:  Recent Labs   Lab Test 01/20/23  1046   AST 32   ALT 22   ALKPHOS 88   BILITOTAL 0.6     Most Recent Cholesterol Panel:  Recent Labs   Lab Test 01/20/23  1347   CHOL 226*   *   HDL 61   TRIG 118     Most Recent 6 Bacteria Isolates From Any Culture (See EPIC Reports for Culture Details):No lab results found.  Most Recent TSH, T4 and HgbA1c:   Recent Labs   Lab Test 01/20/23  1046   A1C 5.4

## 2023-01-24 NOTE — DISCHARGE INSTRUCTIONS
Cardiac Angioplasty/Stent Discharge Instructions - Radial    After you go home:    Have an adult stay with you until tomorrow.  Drink extra fluids for 2 days.  You may resume your normal diet.  No smoking       For 24 hours - due to the sedation you received:  Relax and take it easy.  Do NOT make any important or legal decisions.  Do NOT drive or operate machines at home or at work.  Do NOT drink alcohol.    Care of Wrist Puncture Site:    For the first 24 hrs - check the puncture site every 1-2 hours while awake.  It is normal to have soreness at the puncture site and mild tingling in your hand for up to 3 days.  Remove the bandaid after 24 hours. If there is minor oozing, apply another bandaid and remove it after 12 hours.  You may shower tomorrow.  Do NOT take a bath, or use a hot tub or pool for at least 3 days. Do NOT scrub the site. Do not use lotion or powder near the puncture site.           Activity:          For 2 days:   do not use your hand or arm to support your weight (such as rising from a chair)   do not bend your wrist (such as lifting a garage door).  do not lift more than 5 pounds or exercise your arm (such as tennis, golf or bowling).  Do NOT do any heavy activity such as exercise, lifting, or straining.     Bleeding:    If you start bleeding from the site in your wrist, sit down and press firmly on/above the site for 10 minutes.   Once bleeding stops, keep arm still for 2 hours.   Call [facility] as soon as you can.       Call 911 right away if you have heavy bleeding or bleeding that does not stop.      Medicines:    If you are taking an antiplatelet medication such as Plavix, Brilinta or Effient, do not stop taking it until you talk to your cardiologist.      If you are on Metformin (Glucophage), do not restart it until you have blood tests (within 2 to 3 days after discharge).  After you have your blood drawn, you may restart the Metformin.   Take your medications, including blood thinners,  unless your provider tells you not to.    If you take Coumadin (Warfarin), have your INR checked by your provider in  3-5 days. Call your clinic to schedule this.  If you have stopped any medicines, check with your provider about when to restart them.    Follow Up Appointments:    Follow up with [facility] Nurse Practitioner at [facility] of patient preference in 7-10 days.  Cardiac Rehab will contact you for follow up care.    Call the clinic if:    You have a large or growing hard lump around the site.  The site is red, swollen, hot or tender.  Blood or fluid is draining from the site.  You have chills or a fever greater than 101 F (38 C).  Your arm feels numb, cool or changes color.  You have hives, a rash or unusual itching.  Any questions or concerns.    Other Instructions:    If you received a stent - carry your stent card with you at all times.      [facility] [place]:    [phone] [facility] (7 days a week)

## 2023-01-24 NOTE — PROGRESS NOTES
Pt here with stent to mid lad yesterday. A&O.  VSS. Tele nsr.  Cardiac diet, Up indpt.  Complained of chronic back pain received po percocet. Pt scoring green  on the Aggression Stop Light Tool. Plan- discharge to home today and cardiac rehab.

## 2023-01-24 NOTE — PROVIDER NOTIFICATION
Sent page to hospitalistjoão richard.  patient has metoprolol scheduled.  bp 115/67, HR 53.  do you want a parameter on hr?

## 2023-01-24 NOTE — PROGRESS NOTES
[facility]  Cardiology Progress Note    Date of Service (when I saw the patient): 2023  Summary: Linda Guadalupe is a 62 year old female with history of chronic back pain, tobacco use, hyperlipidemia, and family history of CAD (father  of MI at 49) who was admitted on 2023 with a brief episode of chest discomfort. She was found to have an elevated troponin and a new cardiomyopathy.   Interval History   S/p stenting of the proximal LAD yesterday. No symptoms overnight. She is eager to go home. Rehab planned this morning. She has some bruising at her radial access site but no tenderness, tingling, weakness.  Assessment & Plan    1.  NSTEMI. Admitted with brief episode of chest pain and shortness of breath. HS troponin initially 10 - peaked at 114. Echo with new LV dysfunction and WMAs, concern for type I NSTEMI.   -  Initiated on IV heparin on admission.   -  On aspirin, statin, beta blocker.   -  Underwent coronary angiography on 22 that showed an 80% proximal LAD stenosis which was treated with a JOYCE. She had mild nonobstructive disease elsewhere.  2.  Suspected ischemic cardiomyopathy. Echo with mild to moderate LV dysfunction with EF 40 - 45% and moderate to severe hypokinesis of the mid anterior and anteroseptal walls and all apical segments. Normal RV function and no significant valvular disease.  -  On Metoprolol, lisinopril added .  3.  Ongoing tobacco use. Cessation encouraged.  4.  Hyperlipidemia. . Started on Crestor 20 mg daily.     Plan:  1.  We reviewed her angiogram in detail today. Continue DAPT with aspirin and effient to be continued for one year.  2.  Continue crestor 20 mg daily. Will need f/u lipid panel in clinic. Can increase Crestor if LDL not at goal.   3.  Continue metoprolol, dose decreased to 12.5 mg BID due to bradycardia.   4.  Continue low dose lisinopril at 2.5 mg daily.  5.  Will need follow up echo to reassess EF and wall motion  abnormalities as an outpatient.  6.  Cardiac rehab.  7.  Risk factor modification including smoking cessation.  8.  She should be discharge with a prescription for sublingual nitroglycerin.  9.  Will arrange for cardiology follow up.     No further cardiology recommendations. We will sign off. Please call with questions.     Alayna Avina PA-C    Physical Exam   Temp: 97.9  F (36.6  C) Temp src: Oral BP: 106/63 Pulse: 61   Resp: 16 SpO2: 96 % O2 Device: None (Room air)    Vitals:    01/20/23 1528 01/20/23 2120 01/23/23 0633 01/24/23 0400   Weight: 50.2 kg (110 lb 9.6 oz) 50.6 kg (111 lb 9.6 oz) 51 kg (112 lb 6.4 oz) 50.8 kg (112 lb)     Vital Signs with Ranges  Temp:  [97.8  F (36.6  C)-98  F (36.7  C)] 97.9  F (36.6  C)  Pulse:  [47-62] 61  Resp:  [16-20] 16  BP: ()/(56-80) 106/63  SpO2:  [96 %-100 %] 96 %  01/19 0700 - 01/24 0659  In: 1515 [P.O.:840; I.V.:675]  Out: -   Net: 1515  Constitutional: NAD. .   Cardiovascular: RRR, s1s2, no murmur. R radial access site with surrounding ecchymosis. No hematoma or bruit. No tenderness to palpitation.  GI: soft, BS+  Skin: warm, no rashes  Musculoskeletal: Moving all extremities  Neurologic: Alert, oriented x 3  Neuropsychiatric: Normal affect   Data   Results for orders placed or performed during the hospital encounter of 01/20/23 (from the past 24 hour(s))   EKG 12-lead, tracing only - Hospital [facility]   Result Value Ref Range    Systolic Blood Pressure  mmHg    Diastolic Blood Pressure  mmHg    Ventricular Rate 51 BPM    Atrial Rate 51 BPM    IL Interval 130 ms    QRS Duration 110 ms     ms    QTc 602 ms    P Axis 70 degrees    R AXIS -51 degrees    T Axis 269 degrees    Interpretation ECG       Sinus bradycardia  Incomplete right bundle branch block  Left anterior fascicular block  Marked ST abnormality, possible inferior subendocardial injury  Prolonged QT  Abnormal ECG  When compared with ECG of 21-JAN-2023 10:03, (unconfirmed)  No  significant change was found     Activated clotting time celite, POCT   Result Value Ref Range    Activated Clotting Time (Celite) POCT 233 (H) 74 - 150 seconds   Activated clotting time celite, POCT   Result Value Ref Range    Activated Clotting Time (Celite) POCT 318 (H) 74 - 150 seconds   Cardiac Catheterization    Narrative    1.  Single-vessel obstructive CAD (80% proximal LAD stenosis)  2.  Mild, nonobstructive CAD elsewhere   3.  Successful HD IVUS guided PCI of the proximal LAD with placement of a   single 3.0 x 20 mm Synergy JOYCE, postdilated with a 3.5 mm NC balloon   EKG 12-lead, tracing only   Result Value Ref Range    Systolic Blood Pressure  mmHg    Diastolic Blood Pressure  mmHg    Ventricular Rate 53 BPM    Atrial Rate 53 BPM    DE Interval 134 ms    QRS Duration 110 ms     ms    QTc 551 ms    P Axis 65 degrees    R AXIS -29 degrees    T Axis -89 degrees    Interpretation ECG       Sinus bradycardia with sinus arrhythmia  Incomplete right bundle branch block  Septal infarct , age undetermined  Marked ST abnormality, possible inferior subendocardial injury  Prolonged QT  Abnormal ECG  When compared with ECG of 23-JAN-2023 10:01, (unconfirmed)  Septal infarct is now Present     EKG 12-lead, tracing only   Result Value Ref Range    Systolic Blood Pressure  mmHg    Diastolic Blood Pressure  mmHg    Ventricular Rate 61 BPM    Atrial Rate 61 BPM    DE Interval 158 ms    QRS Duration 104 ms     ms    QTc 503 ms    P Axis 107 degrees    R AXIS -44 degrees    T Axis -87 degrees    Interpretation ECG       Sinus rhythm  Left axis deviation  Pulmonary disease pattern  Incomplete right bundle branch block  Marked ST abnormality, possible inferior subendocardial injury  Prolonged QT  Abnormal ECG  When compared with ECG of 23-JAN-2023 14:02, (unconfirmed)  Criteria for Septal infarct are no longer Present        Tele SR/SB    Medications     - MEDICATION INSTRUCTIONS -       - MEDICATION INSTRUCTIONS -        Percutaneous Coronary Intervention orders placed (this is information for BPA alerting)       ASPIRIN NOT PRESCRIBED         aspirin  81 mg Oral Daily     lisinopril  2.5 mg Oral Daily     metoprolol tartrate  12.5 mg Oral BID     prasugrel  10 mg Oral Daily     rosuvastatin  20 mg Oral Daily     sodium chloride (PF)  3 mL Intracatheter Q8H

## 2023-01-24 NOTE — PLAN OF CARE
Physical Therapy Discharge Summary    Reason for therapy discharge:    All goals and outcomes met, no further needs identified.    Progress towards therapy goal(s). See goals on Care Plan in [facility] electronic health record for goal details.  Goals met    Therapy recommendation(s):    OP CR II

## 2023-01-24 NOTE — PLAN OF CARE
Goal Outcome Evaluation:      Plan of Care Reviewed With: patient      Discharge home with son up in the room independent, tolerated food denies pain, angio site is dry and intact no bleeding no hematoma no bruit. VSS, DUANE.

## 2023-01-24 NOTE — CONSULTS
"NUTRITION EDUCATION    REASON FOR ASSESSMENT:  Nutrition education on American Heart Association (AHA) Heart Healthy Diet.    NUTRITION HISTORY:  Information obtained from patient.   - She reports that she has always eaten a healthy diet, though has not been prescribed the heart healthy diet specifically.   - She loves fruit and veggies and often snacks on them. \"I dont even need ranch or anything\".   - She often eats large salads, fish, tuna - admits that she often eats fried fish (maybe 2x monthly).   - Likes chicken, but only dark meat.   - She does drink a 12 oz regular pepsi daily, but does not eat other sweets/candy/ice cream..  - drinks coffee black. 1 cup daily.   - works in food service, does snack on waters at times.   - Likes to make hot dish but tries not to use too much salty seasoning.   - Uses pepper and other herbs/spices, also [employer] Seasoning Salt.     CURRENT DIET ORDER:  Low Sat Fat, Na <2400 mg     NUTRITION DIAGNOSIS:  Food- and nutrition-related knowledge deficit R/t heart healthy diet exposure AEB patient reports no prior education, though reports eating a generally healthy diet.      INTERVENTIONS:  Nutrition Prescription:    Recommended AHA Heart Healthy Diet    Implementation:     Nutrition Education (Content):  a) reviewed Heart Healthy Diet guidelines  b) provided heart healthy diet handout    Nutrition Education (Application):  a) Discussed current eating habits and recommended alternative food choices    Anticipate good compliance    Diet Education - refer to Education flowsheet    Goals:    Patient verbalizes understanding of diet     All of the above goals met during education session    Follow Up/Monitoring:    Provided RD contact information for future questions    Nancy Swanson RD, LD  [facility]  Pager: [phone]  Weekend Pager: [phone]    "

## 2023-01-24 NOTE — PLAN OF CARE
23 0948   Appointment Info   Signing Clinician's Name / Credentials (PT) Abbi Quintanilla DPT   Living Environment   People in Home alone   Current Living Arrangements house   Transportation Anticipated car, drives self   Self-Care   Usual Activity Tolerance excellent   Current Activity Tolerance good   Fall history within last six months no   Activity/Exercise/Self-Care Comment IND at baseline, active, works as , walks after work   General Information   Onset of Illness/Injury or Date of Surgery 23   Referring Physician Jhoana Mccloud MD   Pertinent History of Current Problem (include personal factors and/or comorbidities that impact the POC) Linda Guadalupe is a 62 year old female with history of chronic back pain, tobacco use, hyperlipidemia, and family history of CAD (father  of MI at 49) who was admitted on 2023 with a brief episode of chest discomfort. She was found to have an elevated troponin and a new cardiomyopathy   Cognition   Affect/Mental Status (Cognition) WNL   Pain Assessment   Patient Currently in Pain No   Posture    Posture Forward head position   Range of Motion (ROM)   ROM Comment BLE WFL   Strength (Manual Muscle Testing)   Strength Comments BLE > 3/5 strength   Bed Mobility   Comment, (Bed Mobility) IND   Transfers   Comment, (Transfers) IND   Gait/Stairs (Locomotion)   Comment, (Gait/Stairs) IND   Balance   Balance Comments No deficits   Clinical Impression   Criteria for Skilled Therapeutic Intervention Yes, treatment indicated   PT Diagnosis (PT) Pt seen for CR phase I during IP stay status PCI to LAD   Influenced by the following impairments Angio with PCI to LAD, reported some weakness and impaired activity tolerance prior to intervention   Functional limitations due to impairments pt IND, following for IP CR phase I, tolerating activity well this AM post PCI   Clinical Presentation (PT Evaluation Complexity) Stable/Uncomplicated   Clinical  Presentation Rationale clinical judgement   Clinical Decision Making (Complexity) low complexity   Planned Therapy Interventions (PT) home exercise program;patient/family education;progressive activity/exercise;risk factor education   Risk & Benefits of therapy have been explained evaluation/treatment results reviewed;care plan/treatment goals reviewed;risks/benefits reviewed;patient   PT Total Evaluation Time   PT Eval, Low Complexity Minutes (30285) 12   Physical Therapy Goals   PT Frequency 2x/day   PT Predicted Duration/Target Date for Goal Attainment 01/26/23   PT Goals Cardiac Phase 1   PT: Understanding of cardiac education to maximize quality of life, condition management, and health outcomes Patient;Verbalize   PT: Perform aerobic activity with stable cardiovascular response continuous;ambulation;15 minutes;treadmill   PT: Functional/aerobic ambulation tolerance with stable cardiovascular response in order to return to home and community environment Independent;Greater than 300 feet   PT: Navigation of stairs simulating home set up with stable cardiovascular response in order to return to home and community environment Independent;Greater than 10 stairs   Interventions   Interventions Quick Adds Therapeutic Activity;Cardiac Rehab;Therapeutic Procedure   Cardiac Education   Education Provided Diagnosis;Diet;Home exercise program;OMNI Scale;Outpatient Cardiac Rehab;Precautions;Resuming home activities;Risk factors;Signs and symptoms;Smoking cessation   Education Packet Given to Patient Yes   All Patient Education Handouts Reviewed with Patient and/or Family Yes   Cardiac Rehab Phase II Plan   Phase II Appointment Status Scheduled   Date/Time 8:00 AM 2/10/23   Location [facility]   PT Discharge Planning   PT Plan DCing after AM session   PT Discharge Recommendation (DC Rec) home;home with outpatient cardiac rehab   PT Rationale for DC Rec Pt mobilizing IND'ly, denied symptoms during activity. BP and HR stable  with activity. Recommend DC to home with OP CR phase II s/p PCI   PT Brief overview of current status IND   Total Session Time   Total Session Time (sum of timed and untimed services) 12

## 2023-01-26 ENCOUNTER — TELEPHONE (OUTPATIENT)
Dept: CARDIOLOGY | Facility: CLINIC | Age: 63
End: 2023-01-26
Payer: COMMERCIAL

## 2023-01-26 ENCOUNTER — PATIENT OUTREACH (OUTPATIENT)
Dept: CARE COORDINATION | Facility: CLINIC | Age: 63
End: 2023-01-26
Payer: COMMERCIAL

## 2023-01-26 DIAGNOSIS — Z71.6 ENCOUNTER FOR SMOKING CESSATION COUNSELING: Primary | ICD-10-CM

## 2023-01-26 DIAGNOSIS — I25.10 CAD (CORONARY ARTERY DISEASE): Primary | ICD-10-CM

## 2023-01-26 NOTE — PROGRESS NOTES
"Clinic Care Coordination Contact  [facility]: Post-Discharge Note  SITUATION                                                      Admission:    Admission Date: 01/20/23   Reason for Admission: one week of malaise and one day of chest pain  Discharge:   Discharge Date: 01/24/23  Discharge Diagnosis: NSTEMI S/p Stent to LAD   Ischemic cardiomyopathy    BACKGROUND                                                      Per hospital discharge summary and inpatient provider notes:    Patient with one week of malaise and one day of chest pain    ASSESSMENT           Discharge Assessment  How are you doing now that you are home?: \"I'm feeling pretty good. I get really tired, really quick and I'm not used to taking medication, but I'm OK\"  How are your symptoms? (Red Flag symptoms escalate to triage hotline per guidelines): Improved  Do you feel your condition is stable enough to be safe at home until your provider visit?: Yes  Does the patient have their discharge instructions? : Yes  Does the patient have questions regarding their discharge instructions? : No  Were you started on any new medications or were there changes to any of your previous medications? : Yes  Does the patient have all of their medications?: Yes  Do you have questions regarding any of your medications? : No  Discharge follow-up appointment scheduled within 14 calendar days? : No (2-10 with cardiac rehab, 2-13 with cardiology and patient states she will call to schedule with PCP within 7 days of discharge)  Is patient agreeable to assistance with scheduling? : No         Post-op (Clinicians Only)  Did the patient have surgery or a procedure: Yes (coronary angiogram)  Incision: closed;healing  Incision site pain: Yes (\"a little\" but nothing she needs to take pain medication for)  Closure: none  Eating & Drinking: eating and drinking without complaints/concerns  PO Intake: low fat diet  Bowel Function: normal  Date of last BM: 01/25/23  Urinary " Status: voiding without complaint/concerns    Patient reports doing OK with new medications. Bothered by being tired. Smoking now down to 1-2 cigarettes daily, from 10-12 daily. Phone number provided for [place] [employer] [employer] program (RN attempted to enter referral but not accepted as patient is not a [facility] patient).     PLAN                                                      Outpatient Plan:  F/u with cardiology in 2weeks       Future Appointments   Date Time Provider Department Center   2/10/2023  8:00 AM 3, Sh Cardiac Rehab [facility]   2/13/2023  8:50 AM Maura Hensley CNP [facility]   6/15/2023  8:45 AM Jewell Garcia MD [facility]         For any urgent concerns, please contact our 24 hour nurse triage line: [phone] ([phone])         Brittny Salgado RN

## 2023-01-26 NOTE — TELEPHONE ENCOUNTER
"Patient was admitted to [facility] on 1/3/23 with a brief episode of chest discomfort. She was found to have an elevated troponin and a new cardiomyopathy-NSTEMI.    PMH: chronic back pain, tobacco use, hyperlipidemia, and family history of CAD (father  of MI at 49).    Echo showed EF of 40 - 45% and moderate to severe hypokinesis of the mid anterior and anteroseptal walls and all apical segments. Normal RV function and no significant valvular disease.    23: Coronary angiogram via RRA resulted in successful HD IVUS guided PCI of the proximal LAD with placement of a single 3.0 x 20 mm Synergy JOYCE, postdilated with a 3.5 mm NC balloon. Mild, nonobstructive CAD elsewhere.    Pt was started on ASA, Lisinopril, Metoprolol, Crestor and Effient. PTA NSAIDS were discontinued at time of discharge.    Pt WAS NOT discharged with PRN SL NTG.    Pt is scheduled for an OV on 23 at 0845 with SHAHANA Maura Hensley at our [place] Office.    Cardiac rehab is scheduled on 2/10/23 at 0740 in [place].    Writer called pt and she states someone already called from [facility] and \"I spoke to her for 30 minutes on the phone and told her how I was feeling.\" Explained to pt that she was an outreach post discharge RN from the hospital and that I was calling from the heart clinic. Pt requests that I call her back tomorrow instead. Will call pt tomorrow per her request. SONALI Dumont RN.            "

## 2023-01-27 NOTE — TELEPHONE ENCOUNTER
Second attempt at trying to contact pt, but now, no answer. VM left to return my phone call. SONALI Dumont RN.

## 2023-02-06 RX ORDER — NITROGLYCERIN 0.4 MG/1
TABLET SUBLINGUAL
Qty: 30 TABLET | Refills: 0 | Status: SHIPPED | OUTPATIENT
Start: 2023-02-06 | End: 2023-02-13

## 2023-02-06 NOTE — TELEPHONE ENCOUNTER
Writer called pt and she denies any chest pain or SOB. Mild lightheadedness. Encouraged pt to make position changes slowly and fluids encouraged.    Pt denies any medication questions and states she has been taking all daily without interruption.    RRA has healed without complication.    Per chart review, no known allergy to nitrates or use of Phosphodiesterase Type 5 Inhibitors. Writer instructed pt on PRN SL Nitroglycerin, including indications, storage and expiration period once bottle opened, administration, expected side effects and when to call the clinic vs 911. Pt verbalized understanding and RX escribed to pt's [facility] per her request.    Reviewed f/u appts as below.    Pt verbalized understanding of all instructions without further questions. SONALI Dumont RN.

## 2023-02-10 ENCOUNTER — HOSPITAL ENCOUNTER (OUTPATIENT)
Dept: CARDIAC REHAB | Facility: CLINIC | Age: 63
Discharge: HOME | End: 2023-02-10
Attending: STUDENT IN AN ORGANIZED HEALTH CARE EDUCATION/TRAINING PROGRAM
Payer: COMMERCIAL

## 2023-02-10 DIAGNOSIS — I21.4 NSTEMI (NON-ST ELEVATED MYOCARDIAL INFARCTION) (H): ICD-10-CM

## 2023-02-10 PROCEDURE — 93797 PHYS/QHP OP CAR RHAB WO ECG: CPT | Mod: 59 | Performed by: OCCUPATIONAL THERAPIST

## 2023-02-10 PROCEDURE — 93798 PHYS/QHP OP CAR RHAB W/ECG: CPT | Performed by: OCCUPATIONAL THERAPIST

## 2023-02-13 ENCOUNTER — OFFICE VISIT (OUTPATIENT)
Dept: CARDIOLOGY | Facility: CLINIC | Age: 63
End: 2023-02-13
Payer: COMMERCIAL

## 2023-02-13 VITALS
DIASTOLIC BLOOD PRESSURE: 82 MMHG | OXYGEN SATURATION: 99 % | HEART RATE: 82 BPM | BODY MASS INDEX: 18 KG/M2 | WEIGHT: 112 LBS | SYSTOLIC BLOOD PRESSURE: 124 MMHG | HEIGHT: 66 IN

## 2023-02-13 DIAGNOSIS — E78.5 HYPERLIPIDEMIA LDL GOAL <70: ICD-10-CM

## 2023-02-13 DIAGNOSIS — I21.4 NSTEMI (NON-ST ELEVATED MYOCARDIAL INFARCTION) (H): Primary | ICD-10-CM

## 2023-02-13 DIAGNOSIS — Z71.6 TOBACCO ABUSE COUNSELING: ICD-10-CM

## 2023-02-13 DIAGNOSIS — I25.5 ISCHEMIC CARDIOMYOPATHY: ICD-10-CM

## 2023-02-13 DIAGNOSIS — Z72.0 TOBACCO ABUSE: ICD-10-CM

## 2023-02-13 PROCEDURE — 99203 OFFICE O/P NEW LOW 30 MIN: CPT | Performed by: NURSE PRACTITIONER

## 2023-02-13 RX ORDER — NITROGLYCERIN 0.4 MG/1
TABLET SUBLINGUAL
Qty: 30 TABLET | Refills: 0 | Status: SHIPPED | OUTPATIENT
Start: 2023-02-13 | End: 2023-03-20

## 2023-02-13 RX ORDER — LISINOPRIL 2.5 MG/1
2.5 TABLET ORAL DAILY
Qty: 90 TABLET | Refills: 3 | Status: SHIPPED | OUTPATIENT
Start: 2023-02-13 | End: 2023-06-15

## 2023-02-13 RX ORDER — ROSUVASTATIN CALCIUM 20 MG/1
20 TABLET, COATED ORAL DAILY
Qty: 90 TABLET | Refills: 3 | Status: SHIPPED | OUTPATIENT
Start: 2023-02-13 | End: 2024-03-06

## 2023-02-13 RX ORDER — METOPROLOL SUCCINATE 25 MG/1
25 TABLET, EXTENDED RELEASE ORAL DAILY
Qty: 90 TABLET | Refills: 3 | Status: SHIPPED | OUTPATIENT
Start: 2023-02-13 | End: 2024-02-07

## 2023-02-13 RX ORDER — PRASUGREL 10 MG/1
10 TABLET, FILM COATED ORAL DAILY
Qty: 90 TABLET | Refills: 3 | Status: SHIPPED | OUTPATIENT
Start: 2023-02-13 | End: 2023-06-15

## 2023-02-13 NOTE — NURSING NOTE
Patient would like a refill for nitroglycerin, states someone from our off said they will send a script to her pharmacy but the pharmacy never got it.

## 2023-02-13 NOTE — PROGRESS NOTES
Cardiology Clinic Progress Note  Linda Guadalupe MRN# [id number]   YOB: 1960 Age: 62 year old     Reason for visit: hospital follow-up     History of presenting illness:    Linda Guadalupe is a pleasant 62 year old patient with past medical history significant for chronic back pain, tobacco use, hyperlipidemia, and family history of CAD, who was recently admitted to [facility] 1/20/2023-1/24/2023 with non-ST elevation myocardial infarction.    Patient initially presented to the ED on 1/20/2023 with chest pain.  High-sensitivity troponins were mildly elevated.  EKG showed no ST or T wave changes.  TTE showed decreased LV function with an EF of 40 to 45% and inferior hypokinesis.  She underwent coronary angiography on 1/23/2022 that showed an 80% proximal LAD lesion that was successfully treated with a single drug-eluting stent.  She was initiated on dual antiplatelet therapy with aspirin and and prasugrel, in addition to low-dose metoprolol and lisinopril an high intensity statin.      Today the patient reports doing well from a cardiovascular standpoint.  She had no recurrent chest discomfort, shortness of breath, syncope or near syncope, or palpitations.  She has no PND or orthopnea.  She has started cardiac rehab and is tolerating this well.  Her right radial site has healed without any significant pain or bruising.  She is a  at a restaurant and remains active on a daily basis.  She overall eats a very healthy diet consisting of fruits and vegetables and lean protein.  She is down to 2 to 3 cigarettes/day.  She is compliant with all her medications.    Her blood pressure is well controlled.  Her weight is stable.    I reviewed her most recent labs.  Her BMP and CBC are unremarkable.  Her lipid panel demonstrates total cholesterol 226, , HDL 61, and triglycerides of 118.         Assessment and Plan:     ASSESSMENT:    1. NSTEMI s/p PCI to pLAD with placement of a single drug-eluting  stent.  No recurrent angina.  Tolerating rehab.  GDMT with DAPT, metoprolol tartrate 12.5 mg twice daily, lisinopril 2.5 mg daily, and rosuvastatin 20 mg dialy.  2. Ischemic cardiomyopathy.  No clinical signs of heart failure.  GDMT with low-dose metoprolol and lisinopril.  3. Hyperlipidemia with goal LDL < 70.  On rosuvastatin 20 mg daily.  4. Tobacco use disorder.  Working on smoking cessation, down to 3 cigarettes/day.      PLAN:     1. We discussed ongoing lifestyle modifications for cardiovascular risk factor reduction, including smoking cessation.  2. Continue dual antiplatelet therapy with aspirin and prasugrel for at least 1 full year, then aspirin lifelong.  3. Transition to Toprol-XL 25 mg daily.  4. Continue lisinopril 2.5 mg daily and rosuvastatin 20 mg daily.  5. Continue outpatient cardiac rehab.  6. Follow-up with Dr. Garcia in June, as previously arranged.  7. We will obtain repeat echocardiogram and lipid panel prior to that visit.         Maura Hensley DNP, APRN, CNP  Page: [phone] (8a-5p M-F)    Orders this Visit:  Orders Placed This Encounter   Procedures     Lipid Profile     ALT     Echocardiogram Complete     Orders Placed This Encounter   Medications     nitroGLYcerin (NITROSTAT) 0.4 MG sublingual tablet     Sig: For chest pain place 1 tablet under the tongue every 5 minutes for 3 doses. If symptoms persist 5 minutes after 2nd dose call 911.     Dispense:  30 tablet     Refill:  0     aspirin (ASA) 81 MG EC tablet     Sig: Take 1 tablet (81 mg) by mouth daily     Dispense:  90 tablet     Refill:  3     lisinopril (ZESTRIL) 2.5 MG tablet     Sig: Take 1 tablet (2.5 mg) by mouth daily     Dispense:  90 tablet     Refill:  3     prasugrel (EFFIENT) 10 MG TABS tablet     Sig: Take 1 tablet (10 mg) by mouth daily     Dispense:  90 tablet     Refill:  3     rosuvastatin (CRESTOR) 20 MG tablet     Sig: Take 1 tablet (20 mg) by mouth daily     Dispense:  90 tablet     Refill:  3      "metoprolol succinate ER (TOPROL XL) 25 MG 24 hr tablet     Sig: Take 1 tablet (25 mg) by mouth daily     Dispense:  90 tablet     Refill:  3     Medications Discontinued During This Encounter   Medication Reason     metoprolol tartrate (LOPRESSOR) 25 MG tablet      aspirin (ASA) 81 MG EC tablet Reorder (No AVS / No eCancel)     lisinopril (ZESTRIL) 2.5 MG tablet Reorder (No AVS / No eCancel)     prasugrel (EFFIENT) 10 MG TABS tablet Reorder (No AVS / No eCancel)     rosuvastatin (CRESTOR) 20 MG tablet Reorder (No AVS / No eCancel)     nitroGLYcerin (NITROSTAT) 0.4 MG sublingual tablet Reorder (No AVS / No eCancel)       Today's clinic visit entailed:  Review of the result(s) of each unique test - cath, labs, echo, rehab  Ordering of each unique test  Prescription drug management  35 minutes spent on the date of the encounter doing chart review, review of test results, patient visit and documentation   Provider  Link to [facility] Help Grid     The level of medical decision making during this visit was of moderate complexity.           Review of Systems:     Review of Systems:  Skin:  not assessed     Eyes:  not assessed    ENT:  not assessed    Respiratory:  Negative shortness of breath;sleep apnea  Cardiovascular:  Negative;palpitations;chest pain;edema lightheadedness;dizziness;Positive for;fatigue  Gastroenterology: not assessed    Genitourinary:  not assessed    Musculoskeletal:  not assessed    Neurologic:  Positive for headaches  Psychiatric:       Heme/Lymph/Imm:  not assessed    Endocrine:  Negative thyroid disorder;diabetes            Physical Exam:   Vitals: /82   Pulse 82   Ht 1.676 m (5' 6\")   Wt 50.8 kg (112 lb)   SpO2 99%   BMI 18.08 kg/m    Constitutional:  cooperative        Skin:  warm and dry to the touch   R radial site healed well    Head:  normocephalic        Eyes:  pupils equal and round        ENT:  no pallor or cyanosis        Neck:  JVP normal        Chest:  normal breath sounds, clear " to auscultation, normal A-P diameter, normal symmetry, normal respiratory excursion, no use of accessory muscles        Cardiac: normal S1 and S2;no murmurs, gallops or rubs detected;regular rhythm                  Abdomen:  abdomen soft        Vascular: pulses full and equal     2+                                Extremities and Back:  no edema        Neurological:  no gross motor deficits;affect appropriate             Medications:     Current Outpatient Medications   Medication Sig Dispense Refill     albuterol (PROAIR HFA/PROVENTIL HFA/VENTOLIN HFA) 108 (90 Base) MCG/ACT inhaler Inhale 1-2 puffs into the lungs every 4 hours as needed for shortness of breath or wheezing       aspirin (ASA) 81 MG EC tablet Take 1 tablet (81 mg) by mouth daily 90 tablet 3     lisinopril (ZESTRIL) 2.5 MG tablet Take 1 tablet (2.5 mg) by mouth daily 90 tablet 3     metoprolol succinate ER (TOPROL XL) 25 MG 24 hr tablet Take 1 tablet (25 mg) by mouth daily 90 tablet 3     nitroGLYcerin (NITROSTAT) 0.4 MG sublingual tablet For chest pain place 1 tablet under the tongue every 5 minutes for 3 doses. If symptoms persist 5 minutes after 2nd dose call 911. 30 tablet 0     oxyCODONE-acetaminophen (PERCOCET) 5-325 MG per tablet Take 1-2 tablets by mouth every 6 hours as needed for moderate to severe pain 16 tablet 0     prasugrel (EFFIENT) 10 MG TABS tablet Take 1 tablet (10 mg) by mouth daily 90 tablet 3     rosuvastatin (CRESTOR) 20 MG tablet Take 1 tablet (20 mg) by mouth daily 90 tablet 3       Family History   Problem Relation Age of Onset     Unknown/Adopted Mother      Unknown/Adopted Father        Social History     Socioeconomic History     Marital status:      Spouse name: Not on file     Number of children: Not on file     Years of education: Not on file     Highest education level: Not on file   Occupational History     Not on file   Tobacco Use     Smoking status: Every Day     Years: 30.00     Types: Cigarettes      Smokeless tobacco: Never     Tobacco comments:     3-4 cigs per day   Substance and Sexual Activity     Alcohol use: Yes     Comment: socially     Drug use: No     Sexual activity: Yes     Partners: Male   Other Topics Concern     Parent/sibling w/ CABG, MI or angioplasty before 65F 55M? Yes     Comment: father heart attack at 49   Social History Narrative     Not on file     Social Determinants of Health     Financial Resource Strain: Not on file   Food Insecurity: Not on file   Transportation Needs: Not on file   Physical Activity: Not on file   Stress: Not on file   Social Connections: Not on file   Intimate Partner Violence: Not on file   Housing Stability: Not on file            Past Medical History:     Past Medical History:   Diagnosis Date     Tobacco abuse 11/13/2012              Past Surgical History:     Past Surgical History:   Procedure Laterality Date     CV CORONARY ANGIOGRAM N/A 1/23/2023    Procedure: Coronary Angiogram;  Surgeon: Taco Price MD;  Location: [facility] CARDIAC CATH LAB     CV INTRAVASULAR ULTRASOUND N/A 1/23/2023    Procedure: Intravascular Ultrasound;  Surgeon: Taco Price MD;  Location: [facility] CARDIAC CATH LAB     CV PCI STENT DRUG ELUTING N/A 1/23/2023    Procedure: Percutaneous Coronary Intervention Stent;  Surgeon: Taco Price MD;  Location: [facility] CARDIAC CATH LAB     ORTHOPEDIC SURGERY  1996    R shoulder              Allergies:   Patient has no known allergies.       Data:   All laboratory data reviewed:    Recent Labs   Lab Test 01/20/23  1347   *   HDL 61   NHDL 165*   CHOL 226*   TRIG 118       Lab Results   Component Value Date    WBC 6.8 01/23/2023    WBC 14.3 (H) 05/11/2016    RBC 4.43 01/23/2023    RBC 4.02 05/11/2016    HGB 14.0 01/23/2023    HGB 12.7 05/11/2016    HCT 42.0 01/23/2023    HCT 38.5 05/11/2016    MCV 95 01/23/2023    MCV 96 05/11/2016    MCH 31.6 01/23/2023    MCH 31.6 05/11/2016    MCHC 33.3 01/23/2023     MCHC 33.0 05/11/2016    RDW 12.2 01/23/2023    RDW 12.0 05/11/2016     01/23/2023     05/11/2016       Lab Results   Component Value Date     (L) 01/22/2023     05/11/2016    POTASSIUM 4.4 01/22/2023    POTASSIUM 3.6 05/11/2016    CHLORIDE 102 01/22/2023    CHLORIDE 101 05/11/2016    CO2 26 01/22/2023    CO2 30 05/11/2016    ANIONGAP 7 01/22/2023    ANIONGAP 5 05/11/2016     (H) 01/22/2023     (H) 05/11/2016    BUN 14.5 01/22/2023    BUN 9 05/11/2016    CR 0.70 01/22/2023    CR 0.63 05/11/2016    GFRESTIMATED >90 01/22/2023    GFRESTIMATED >90  Non  GFR Calc   05/11/2016    GFRESTBLACK >90   GFR Calc   05/11/2016    VETO 9.3 01/22/2023    VETO 9.1 05/11/2016      Lab Results   Component Value Date    AST 32 01/20/2023    AST 39 06/05/2014    ALT 22 01/20/2023    ALT 25 06/05/2014       Lab Results   Component Value Date    A1C 5.4 01/20/2023       No results found for: INR

## 2023-02-13 NOTE — LETTER
2/13/2023    Shannen Kumari [address] [facility] [address]    RE: Linda Guadalupe       Dear Colleague,     I had the pleasure of seeing Linda Guadalupe in the [facility].  Cardiology Clinic Progress Note  Linda Guadalupe MRN# [id number]   YOB: 1960 Age: 62 year old     Reason for visit: hospital follow-up     History of presenting illness:    Linda Guadalupe is a pleasant 62 year old patient with past medical history significant for chronic back pain, tobacco use, hyperlipidemia, and family history of CAD, who was recently admitted to [facility] 1/20/2023-1/24/2023 with non-ST elevation myocardial infarction.    Patient initially presented to the ED on 1/20/2023 with chest pain.  High-sensitivity troponins were mildly elevated.  EKG showed no ST or T wave changes.  TTE showed decreased LV function with an EF of 40 to 45% and inferior hypokinesis.  She underwent coronary angiography on 1/23/2022 that showed an 80% proximal LAD lesion that was successfully treated with a single drug-eluting stent.  She was initiated on dual antiplatelet therapy with aspirin and and prasugrel, in addition to low-dose metoprolol and lisinopril an high intensity statin.      Today the patient reports doing well from a cardiovascular standpoint.  She had no recurrent chest discomfort, shortness of breath, syncope or near syncope, or palpitations.  She has no PND or orthopnea.  She has started cardiac rehab and is tolerating this well.  Her right radial site has healed without any significant pain or bruising.  She is a  at a restaurant and remains active on a daily basis.  She overall eats a very healthy diet consisting of fruits and vegetables and lean protein.  She is down to 2 to 3 cigarettes/day.  She is compliant with all her medications.    Her blood pressure is well controlled.  Her weight is stable.    I reviewed her most recent labs.  Her BMP and CBC are unremarkable.  Her  lipid panel demonstrates total cholesterol 226, , HDL 61, and triglycerides of 118.         Assessment and Plan:     ASSESSMENT:    1. NSTEMI s/p PCI to pLAD with placement of a single drug-eluting stent.  No recurrent angina.  Tolerating rehab.  GDMT with DAPT, metoprolol tartrate 12.5 mg twice daily, lisinopril 2.5 mg daily, and rosuvastatin 20 mg dialy.  2. Ischemic cardiomyopathy.  No clinical signs of heart failure.  GDMT with low-dose metoprolol and lisinopril.  3. Hyperlipidemia with goal LDL < 70.  On rosuvastatin 20 mg daily.  4. Tobacco use disorder.  Working on smoking cessation, down to 3 cigarettes/day.      PLAN:     1. We discussed ongoing lifestyle modifications for cardiovascular risk factor reduction, including smoking cessation.  2. Continue dual antiplatelet therapy with aspirin and prasugrel for at least 1 full year, then aspirin lifelong.  3. Transition to Toprol-XL 25 mg daily.  4. Continue lisinopril 2.5 mg daily and rosuvastatin 20 mg daily.  5. Continue outpatient cardiac rehab.  6. Follow-up with Dr. Garcia in June, as previously arranged.  7. We will obtain repeat echocardiogram and lipid panel prior to that visit.         Maura Hensley DNP, APRN, CNP  Page: [phone] (8a-5p M-F)    Orders this Visit:  Orders Placed This Encounter   Procedures     Lipid Profile     ALT     Echocardiogram Complete     Orders Placed This Encounter   Medications     nitroGLYcerin (NITROSTAT) 0.4 MG sublingual tablet     Sig: For chest pain place 1 tablet under the tongue every 5 minutes for 3 doses. If symptoms persist 5 minutes after 2nd dose call 911.     Dispense:  30 tablet     Refill:  0     aspirin (ASA) 81 MG EC tablet     Sig: Take 1 tablet (81 mg) by mouth daily     Dispense:  90 tablet     Refill:  3     lisinopril (ZESTRIL) 2.5 MG tablet     Sig: Take 1 tablet (2.5 mg) by mouth daily     Dispense:  90 tablet     Refill:  3     prasugrel (EFFIENT) 10 MG TABS tablet     Sig: Take 1  "tablet (10 mg) by mouth daily     Dispense:  90 tablet     Refill:  3     rosuvastatin (CRESTOR) 20 MG tablet     Sig: Take 1 tablet (20 mg) by mouth daily     Dispense:  90 tablet     Refill:  3     metoprolol succinate ER (TOPROL XL) 25 MG 24 hr tablet     Sig: Take 1 tablet (25 mg) by mouth daily     Dispense:  90 tablet     Refill:  3     Medications Discontinued During This Encounter   Medication Reason     metoprolol tartrate (LOPRESSOR) 25 MG tablet      aspirin (ASA) 81 MG EC tablet Reorder (No AVS / No eCancel)     lisinopril (ZESTRIL) 2.5 MG tablet Reorder (No AVS / No eCancel)     prasugrel (EFFIENT) 10 MG TABS tablet Reorder (No AVS / No eCancel)     rosuvastatin (CRESTOR) 20 MG tablet Reorder (No AVS / No eCancel)     nitroGLYcerin (NITROSTAT) 0.4 MG sublingual tablet Reorder (No AVS / No eCancel)       Today's clinic visit entailed:  Review of the result(s) of each unique test - cath, labs, echo, rehab  Ordering of each unique test  Prescription drug management  35 minutes spent on the date of the encounter doing chart review, review of test results, patient visit and documentation   Provider  Link to [facility] Help Grid     The level of medical decision making during this visit was of moderate complexity.           Review of Systems:     Review of Systems:  Skin:  not assessed     Eyes:  not assessed    ENT:  not assessed    Respiratory:  Negative shortness of breath;sleep apnea  Cardiovascular:  Negative;palpitations;chest pain;edema lightheadedness;dizziness;Positive for;fatigue  Gastroenterology: not assessed    Genitourinary:  not assessed    Musculoskeletal:  not assessed    Neurologic:  Positive for headaches  Psychiatric:       Heme/Lymph/Imm:  not assessed    Endocrine:  Negative thyroid disorder;diabetes            Physical Exam:   Vitals: /82   Pulse 82   Ht 1.676 m (5' 6\")   Wt 50.8 kg (112 lb)   SpO2 99%   BMI 18.08 kg/m    Constitutional:  cooperative        Skin:  warm and dry to " the touch   R radial site healed well    Head:  normocephalic        Eyes:  pupils equal and round        ENT:  no pallor or cyanosis        Neck:  JVP normal        Chest:  normal breath sounds, clear to auscultation, normal A-P diameter, normal symmetry, normal respiratory excursion, no use of accessory muscles        Cardiac: normal S1 and S2;no murmurs, gallops or rubs detected;regular rhythm                  Abdomen:  abdomen soft        Vascular: pulses full and equal     2+                                Extremities and Back:  no edema        Neurological:  no gross motor deficits;affect appropriate             Medications:     Current Outpatient Medications   Medication Sig Dispense Refill     albuterol (PROAIR HFA/PROVENTIL HFA/VENTOLIN HFA) 108 (90 Base) MCG/ACT inhaler Inhale 1-2 puffs into the lungs every 4 hours as needed for shortness of breath or wheezing       aspirin (ASA) 81 MG EC tablet Take 1 tablet (81 mg) by mouth daily 90 tablet 3     lisinopril (ZESTRIL) 2.5 MG tablet Take 1 tablet (2.5 mg) by mouth daily 90 tablet 3     metoprolol succinate ER (TOPROL XL) 25 MG 24 hr tablet Take 1 tablet (25 mg) by mouth daily 90 tablet 3     nitroGLYcerin (NITROSTAT) 0.4 MG sublingual tablet For chest pain place 1 tablet under the tongue every 5 minutes for 3 doses. If symptoms persist 5 minutes after 2nd dose call 911. 30 tablet 0     oxyCODONE-acetaminophen (PERCOCET) 5-325 MG per tablet Take 1-2 tablets by mouth every 6 hours as needed for moderate to severe pain 16 tablet 0     prasugrel (EFFIENT) 10 MG TABS tablet Take 1 tablet (10 mg) by mouth daily 90 tablet 3     rosuvastatin (CRESTOR) 20 MG tablet Take 1 tablet (20 mg) by mouth daily 90 tablet 3       Family History   Problem Relation Age of Onset     Unknown/Adopted Mother      Unknown/Adopted Father        Social History     Socioeconomic History     Marital status:      Spouse name: Not on file     Number of children: Not on file      Years of education: Not on file     Highest education level: Not on file   Occupational History     Not on file   Tobacco Use     Smoking status: Every Day     Years: 30.00     Types: Cigarettes     Smokeless tobacco: Never     Tobacco comments:     3-4 cigs per day   Substance and Sexual Activity     Alcohol use: Yes     Comment: socially     Drug use: No     Sexual activity: Yes     Partners: Male   Other Topics Concern     Parent/sibling w/ CABG, MI or angioplasty before 65F 55M? Yes     Comment: father heart attack at 49   Social History Narrative     Not on file     Social Determinants of Health     Financial Resource Strain: Not on file   Food Insecurity: Not on file   Transportation Needs: Not on file   Physical Activity: Not on file   Stress: Not on file   Social Connections: Not on file   Intimate Partner Violence: Not on file   Housing Stability: Not on file            Past Medical History:     Past Medical History:   Diagnosis Date     Tobacco abuse 11/13/2012              Past Surgical History:     Past Surgical History:   Procedure Laterality Date     CV CORONARY ANGIOGRAM N/A 1/23/2023    Procedure: Coronary Angiogram;  Surgeon: Taco Price MD;  Location: [facility] CARDIAC CATH LAB     CV INTRAVASULAR ULTRASOUND N/A 1/23/2023    Procedure: Intravascular Ultrasound;  Surgeon: Taco Price MD;  Location: [facility] CARDIAC CATH LAB     CV PCI STENT DRUG ELUTING N/A 1/23/2023    Procedure: Percutaneous Coronary Intervention Stent;  Surgeon: Taco Price MD;  Location: [facility] CARDIAC CATH LAB     ORTHOPEDIC SURGERY  1996 R shoulder              Allergies:   Patient has no known allergies.       Data:   All laboratory data reviewed:    Recent Labs   Lab Test 01/20/23  1347   *   HDL 61   NHDL 165*   CHOL 226*   TRIG 118       Lab Results   Component Value Date    WBC 6.8 01/23/2023    WBC 14.3 (H) 05/11/2016    RBC 4.43 01/23/2023    RBC 4.02 05/11/2016    HGB 14.0  01/23/2023    HGB 12.7 05/11/2016    HCT 42.0 01/23/2023    HCT 38.5 05/11/2016    MCV 95 01/23/2023    MCV 96 05/11/2016    MCH 31.6 01/23/2023    MCH 31.6 05/11/2016    MCHC 33.3 01/23/2023    MCHC 33.0 05/11/2016    RDW 12.2 01/23/2023    RDW 12.0 05/11/2016     01/23/2023     05/11/2016       Lab Results   Component Value Date     (L) 01/22/2023     05/11/2016    POTASSIUM 4.4 01/22/2023    POTASSIUM 3.6 05/11/2016    CHLORIDE 102 01/22/2023    CHLORIDE 101 05/11/2016    CO2 26 01/22/2023    CO2 30 05/11/2016    ANIONGAP 7 01/22/2023    ANIONGAP 5 05/11/2016     (H) 01/22/2023     (H) 05/11/2016    BUN 14.5 01/22/2023    BUN 9 05/11/2016    CR 0.70 01/22/2023    CR 0.63 05/11/2016    GFRESTIMATED >90 01/22/2023    GFRESTIMATED >90  Non  GFR Calc   05/11/2016    GFRESTBLACK >90   GFR Calc   05/11/2016    VETO 9.3 01/22/2023    VETO 9.1 05/11/2016      Lab Results   Component Value Date    AST 32 01/20/2023    AST 39 06/05/2014    ALT 22 01/20/2023    ALT 25 06/05/2014       Lab Results   Component Value Date    A1C 5.4 01/20/2023       No results found for: INR          Thank you for allowing me to participate in the care of your patient.      Sincerely,     Maura Hensley, CNP     [facility]  cc:   Alayna Avina, ANILA  [address]  [place] [id number]

## 2023-02-13 NOTE — PATIENT INSTRUCTIONS
Today's Plan:     1) Continue cardiac rehab.     2) Follow-up with Dr. Price with repeat lipid panel (cholesterol) in 2-3 months.     If you have questions or concerns please call my nurse team at [phone].     Scheduling phone number: [phone]    Reminder: Please bring in all current medications, over the counter supplements and vitamin bottles to your next appointment.-    It was a pleasure seeing you today!     Maura Hensley, ABBE  2/13/2023    -

## 2023-03-20 DIAGNOSIS — I21.4 NSTEMI (NON-ST ELEVATED MYOCARDIAL INFARCTION) (H): ICD-10-CM

## 2023-03-20 RX ORDER — NITROGLYCERIN 0.4 MG/1
TABLET SUBLINGUAL
Qty: 25 TABLET | Refills: 1 | Status: SHIPPED | OUTPATIENT
Start: 2023-03-20

## 2023-04-01 ENCOUNTER — HEALTH MAINTENANCE LETTER (OUTPATIENT)
Age: 63
End: 2023-04-01

## 2023-05-31 ENCOUNTER — HOSPITAL ENCOUNTER (OUTPATIENT)
Dept: CARDIOLOGY | Facility: CLINIC | Age: 63
Discharge: HOME | End: 2023-05-31
Attending: NURSE PRACTITIONER | Admitting: NURSE PRACTITIONER
Payer: COMMERCIAL

## 2023-05-31 ENCOUNTER — LAB (OUTPATIENT)
Dept: LAB | Facility: CLINIC | Age: 63
End: 2023-05-31
Payer: COMMERCIAL

## 2023-05-31 DIAGNOSIS — E78.5 HYPERLIPIDEMIA LDL GOAL <70: ICD-10-CM

## 2023-05-31 DIAGNOSIS — I25.5 ISCHEMIC CARDIOMYOPATHY: ICD-10-CM

## 2023-05-31 LAB
ALT SERPL W P-5'-P-CCNC: 24 U/L (ref 10–35)
CHOLEST SERPL-MCNC: 146 MG/DL
HDLC SERPL-MCNC: 62 MG/DL
LDLC SERPL CALC-MCNC: 67 MG/DL
LVEF ECHO: NORMAL
NONHDLC SERPL-MCNC: 84 MG/DL
TRIGL SERPL-MCNC: 85 MG/DL

## 2023-05-31 PROCEDURE — 93306 TTE W/DOPPLER COMPLETE: CPT | Mod: 26 | Performed by: INTERNAL MEDICINE

## 2023-05-31 PROCEDURE — 84460 ALANINE AMINO (ALT) (SGPT): CPT | Performed by: NURSE PRACTITIONER

## 2023-05-31 PROCEDURE — 36415 COLL VENOUS BLD VENIPUNCTURE: CPT | Performed by: NURSE PRACTITIONER

## 2023-05-31 PROCEDURE — 93306 TTE W/DOPPLER COMPLETE: CPT

## 2023-05-31 PROCEDURE — 80061 LIPID PANEL: CPT | Performed by: NURSE PRACTITIONER

## 2023-06-01 ENCOUNTER — TELEPHONE (OUTPATIENT)
Dept: CARDIOLOGY | Facility: CLINIC | Age: 63
End: 2023-06-01
Payer: COMMERCIAL

## 2023-06-01 NOTE — TELEPHONE ENCOUNTER
Left detailed voice message for pt communicating Maura's review of her pre-visit lab and echo results.  Pt has a visit with Dr Garcia on 6/15/23.  Pt takes rosuvastatin 20 mg daily.      Maura Hensley CNP   6/1/2023  7:32 AM CDT Back to Top      Her LV function has normalized which a great news. This can be discussed further at her upcoming appointment with Dr. Garcia on 6/15.      Maura Hensley CNP   6/1/2023  7:34 AM CDT Back to Top      There has been a significant improvement in her lipid panel, her LDL is now at goal of less than 70 mg/dL.

## 2023-06-15 ENCOUNTER — OFFICE VISIT (OUTPATIENT)
Dept: CARDIOLOGY | Facility: CLINIC | Age: 63
End: 2023-06-15
Attending: PHYSICIAN ASSISTANT
Payer: COMMERCIAL

## 2023-06-15 VITALS
SYSTOLIC BLOOD PRESSURE: 121 MMHG | HEIGHT: 66 IN | WEIGHT: 111.8 LBS | HEART RATE: 64 BPM | BODY MASS INDEX: 17.97 KG/M2 | OXYGEN SATURATION: 98 % | DIASTOLIC BLOOD PRESSURE: 69 MMHG

## 2023-06-15 DIAGNOSIS — Z72.0 TOBACCO ABUSE: ICD-10-CM

## 2023-06-15 DIAGNOSIS — E78.5 DYSLIPIDEMIA: Primary | ICD-10-CM

## 2023-06-15 DIAGNOSIS — I21.4 NSTEMI (NON-ST ELEVATED MYOCARDIAL INFARCTION) (H): ICD-10-CM

## 2023-06-15 PROCEDURE — 99214 OFFICE O/P EST MOD 30 MIN: CPT | Performed by: INTERNAL MEDICINE

## 2023-06-15 RX ORDER — CLOPIDOGREL BISULFATE 75 MG/1
75 TABLET ORAL DAILY
Qty: 90 TABLET | Refills: 3 | Status: SHIPPED | OUTPATIENT
Start: 2023-06-15 | End: 2024-01-26

## 2023-06-15 NOTE — PROGRESS NOTES
[facility] Progress note    Assessment:  1. CAD, h/o anterior MI Jan 2023 s/p JOYCE x 1.  -continue metoprolol, lisinopril, asa, PRN nitroglycerin, and has been taking prasugrel  2. HTN  3. HLD   4. Tobacco smoker  5. Nuisance bruising  6. fatigue    Plan:  1. Stop prasugrel (due to her low body weight and significant nuisance bruising)  2. Start clopidogrel 300mg loading dose, then 75 mg PO daily.  3. Stop low-dose lisinopril. BP is well-controlled, EF has normalized and she has fatigue.  4. If she continues to have low normal BP and fatigue could decrease metoprolol to 12.5mg PO daily.  5. Absolute smoking cessation strongly recommended.   6. Follow up in Jan 2024 (one yr post MI).     HPI:     Linda Guadalupe is a very nice 63 year old F patient with history of NSTEMI Jan 2023 s/p JOYCE to proximal LAD. Initial EF was 40-45%. Echo 5/31/23 EF 55-60% with no wall motion abnormalities.     Overall she is doing well.  She notes that she has very easy bruising and gets large tender bruise from even mild bumps.  She also notes some fatigue but no dyspnea or chest pain.  She works as a  and her job is very busy and she had some social stressors lately so she thinks that might be the main reason she has fatigued. She is active and eats a healthy diet. unfortutely she is smoking 3 cigarettes a day.  She been trying quit.      Echo 5/31/23: Interpretation Summary 1. Normal biventricular size and function. Left ventricular ejection fractionof 55-60%.2. No segmental wall motion abnormalities noted.3. There is mild (1+) tricuspid regurgitation.Compared to the previous echocardiogram on 1/21/2023. LV systolic funciton isnormal today. WMA are no longer present.        CURRENT MEDICATIONS:  Current Outpatient Medications   Medication Sig Dispense Refill     albuterol (PROAIR HFA/PROVENTIL HFA/VENTOLIN HFA) 108 (90 Base) MCG/ACT inhaler Inhale 1-2 puffs into the lungs every 4 hours as needed for shortness of breath or  wheezing       aspirin (ASA) 81 MG EC tablet Take 1 tablet (81 mg) by mouth daily 90 tablet 3     lisinopril (ZESTRIL) 2.5 MG tablet Take 1 tablet (2.5 mg) by mouth daily 90 tablet 3     metoprolol succinate ER (TOPROL XL) 25 MG 24 hr tablet Take 1 tablet (25 mg) by mouth daily 90 tablet 3     nitroGLYcerin (NITROSTAT) 0.4 MG sublingual tablet For chest pain place 1 tablet under the tongue every 5 minutes for 3 doses. If symptoms persist 5 minutes after 2nd dose call 911. 25 tablet 1     oxyCODONE-acetaminophen (PERCOCET) 5-325 MG per tablet Take 1-2 tablets by mouth every 6 hours as needed for moderate to severe pain (Patient taking differently: Take 1-2 tablets by mouth 4 times daily (with meals and nightly)) 16 tablet 0     prasugrel (EFFIENT) 10 MG TABS tablet Take 1 tablet (10 mg) by mouth daily 90 tablet 3     rosuvastatin (CRESTOR) 20 MG tablet Take 1 tablet (20 mg) by mouth daily 90 tablet 3       ALLERGIES   No Known Allergies    PAST MEDICAL, SURGICAL, FAMILY, SOCIAL HISTORY:  History was reviewed and updated as needed, see medical record.    REVIEW OF SYSTEMS:  Skin:  not assessed     Eyes:  not assessed    ENT:  not assessed    Respiratory:  Negative    Cardiovascular:    fatigue;Positive for  Gastroenterology: not assessed    Genitourinary:  not assessed    Musculoskeletal:  not assessed    Neurologic:  not assessed    Psychiatric:  not assessed    Heme/Lymph/Imm:  not assessed    Endocrine:  Negative      PHYSICAL EXAM:      BP: 121/69 Pulse: 64     SpO2: 98 %      Vital Signs with Ranges  Pulse:  [64] 64  BP: (121)/(69) 121/69  SpO2:  [98 %] 98 %  111 lbs 12.8 oz    Constitutional: awake, alert, no distress. Odor of cigarette smoke  Eyes: sclera nonicteric  ENT: trachea midline  Respiratory: clear bilaterally  Cardiovascular: regular rate and rhythm no murmur  GI: nondistended, nontender, bowel sounds present  Skin: dry, no rash noedema. Large bruise near right elbow  Musculoskeletal: grossly normal  muscle bulk and tone  Neurologic: no  gross focal deficits  Neuropsychiatric:  normal affect    Recent Lab Results:  LIPID RESULTS:  Lab Results   Component Value Date    CHOL 146 05/31/2023    CHOL 209 (H) 06/05/2014    HDL 62 05/31/2023    HDL 47 (L) 06/05/2014    LDL 67 05/31/2023     06/05/2014    TRIG 85 05/31/2023    TRIG 175 (H) 06/05/2014    CHOLHDLRATIO 4.5 06/05/2014       LIVER ENZYME RESULTS:  Lab Results   Component Value Date    AST 32 01/20/2023    AST 39 06/05/2014    ALT 24 05/31/2023    ALT 25 06/05/2014       CBC RESULTS:  Lab Results   Component Value Date    WBC 6.8 01/23/2023    WBC 14.3 (H) 05/11/2016    RBC 4.43 01/23/2023    RBC 4.02 05/11/2016    HGB 14.0 01/23/2023    HGB 12.7 05/11/2016    HCT 42.0 01/23/2023    HCT 38.5 05/11/2016    MCV 95 01/23/2023    MCV 96 05/11/2016    MCH 31.6 01/23/2023    MCH 31.6 05/11/2016    MCHC 33.3 01/23/2023    MCHC 33.0 05/11/2016    RDW 12.2 01/23/2023    RDW 12.0 05/11/2016     01/23/2023     05/11/2016       BMP RESULTS:  Lab Results   Component Value Date     (L) 01/22/2023     05/11/2016    POTASSIUM 4.4 01/22/2023    POTASSIUM 3.6 05/11/2016    CHLORIDE 102 01/22/2023    CHLORIDE 101 05/11/2016    CO2 26 01/22/2023    CO2 30 05/11/2016    ANIONGAP 7 01/22/2023    ANIONGAP 5 05/11/2016     (H) 01/22/2023     (H) 05/11/2016    BUN 14.5 01/22/2023    BUN 9 05/11/2016    CR 0.70 01/22/2023    CR 0.63 05/11/2016    GFRESTIMATED >90 01/22/2023    GFRESTIMATED >90  Non  GFR Calc   05/11/2016    GFRESTBLACK >90   GFR Calc   05/11/2016    VETO 9.3 01/22/2023    VETO 9.1 05/11/2016        A1C RESULTS:  Lab Results   Component Value Date    A1C 5.4 01/20/2023       INR RESULTS:  No results found for: INR

## 2023-06-15 NOTE — LETTER
6/15/2023    Shannen Rodas  [address] [facility] [address]    RE: Linda Guadalupe       Dear Colleague,     I had the pleasure of seeing Linda Guadalupe in the [facility].  [facility] Progress note    Assessment:  CAD, h/o anterior MI Jan 2023 s/p JOYCE x 1.  -continue metoprolol, lisinopril, asa, PRN nitroglycerin, and has been taking prasugrel  HTN  HLD   Tobacco smoker  Nuisance bruising  fatigue    Plan:  Stop prasugrel (due to her low body weight and significant nuisance bruising)  Start clopidogrel 300mg loading dose, then 75 mg PO daily.  Stop low-dose lisinopril. BP is well-controlled, EF has normalized and she has fatigue.  If she continues to have low normal BP and fatigue could decrease metoprolol to 12.5mg PO daily.  Absolute smoking cessation strongly recommended.   Follow up in Jan 2024 (one yr post MI).     HPI:     Linda Guadalupe is a very nice 63 year old F patient with history of NSTEMI Jan 2023 s/p JOYCE to proximal LAD. Initial EF was 40-45%. Echo 5/31/23 EF 55-60% with no wall motion abnormalities.     Overall she is doing well.  She notes that she has very easy bruising and gets large tender bruise from even mild bumps.  She also notes some fatigue but no dyspnea or chest pain.  She works as a  and her job is very busy and she had some social stressors lately so she thinks that might be the main reason she has fatigued. She is active and eats a healthy diet. unfortutely she is smoking 3 cigarettes a day.  She been trying quit.      Echo 5/31/23: Interpretation Summary 1. Normal biventricular size and function. Left ventricular ejection fractionof 55-60%.2. No segmental wall motion abnormalities noted.3. There is mild (1+) tricuspid regurgitation.Compared to the previous echocardiogram on 1/21/2023. LV systolic funciton isnormal today. WMA are no longer present.        CURRENT MEDICATIONS:  Current Outpatient Medications   Medication Sig Dispense Refill     albuterol (PROAIR HFA/PROVENTIL HFA/VENTOLIN HFA) 108 (90 Base) MCG/ACT inhaler Inhale 1-2 puffs into the lungs every 4 hours as needed for shortness of breath or wheezing      aspirin (ASA) 81 MG EC tablet Take 1 tablet (81 mg) by mouth daily 90 tablet 3    lisinopril (ZESTRIL) 2.5 MG tablet Take 1 tablet (2.5 mg) by mouth daily 90 tablet 3    metoprolol succinate ER (TOPROL XL) 25 MG 24 hr tablet Take 1 tablet (25 mg) by mouth daily 90 tablet 3    nitroGLYcerin (NITROSTAT) 0.4 MG sublingual tablet For chest pain place 1 tablet under the tongue every 5 minutes for 3 doses. If symptoms persist 5 minutes after 2nd dose call 911. 25 tablet 1    oxyCODONE-acetaminophen (PERCOCET) 5-325 MG per tablet Take 1-2 tablets by mouth every 6 hours as needed for moderate to severe pain (Patient taking differently: Take 1-2 tablets by mouth 4 times daily (with meals and nightly)) 16 tablet 0    prasugrel (EFFIENT) 10 MG TABS tablet Take 1 tablet (10 mg) by mouth daily 90 tablet 3    rosuvastatin (CRESTOR) 20 MG tablet Take 1 tablet (20 mg) by mouth daily 90 tablet 3       ALLERGIES   No Known Allergies    PAST MEDICAL, SURGICAL, FAMILY, SOCIAL HISTORY:  History was reviewed and updated as needed, see medical record.    REVIEW OF SYSTEMS:  Skin:  not assessed     Eyes:  not assessed    ENT:  not assessed    Respiratory:  Negative    Cardiovascular:    fatigue;Positive for  Gastroenterology: not assessed    Genitourinary:  not assessed    Musculoskeletal:  not assessed    Neurologic:  not assessed    Psychiatric:  not assessed    Heme/Lymph/Imm:  not assessed    Endocrine:  Negative      PHYSICAL EXAM:      BP: 121/69 Pulse: 64     SpO2: 98 %      Vital Signs with Ranges  Pulse:  [64] 64  BP: (121)/(69) 121/69  SpO2:  [98 %] 98 %  111 lbs 12.8 oz    Constitutional: awake, alert, no distress. Odor of cigarette smoke  Eyes: sclera nonicteric  ENT: trachea midline  Respiratory: clear bilaterally  Cardiovascular: regular rate and  rhythm no murmur  GI: nondistended, nontender, bowel sounds present  Skin: dry, no rash noedema. Large bruise near right elbow  Musculoskeletal: grossly normal muscle bulk and tone  Neurologic: no  gross focal deficits  Neuropsychiatric:  normal affect    Recent Lab Results:  LIPID RESULTS:  Lab Results   Component Value Date    CHOL 146 05/31/2023    CHOL 209 (H) 06/05/2014    HDL 62 05/31/2023    HDL 47 (L) 06/05/2014    LDL 67 05/31/2023     06/05/2014    TRIG 85 05/31/2023    TRIG 175 (H) 06/05/2014    CHOLHDLRATIO 4.5 06/05/2014       LIVER ENZYME RESULTS:  Lab Results   Component Value Date    AST 32 01/20/2023    AST 39 06/05/2014    ALT 24 05/31/2023    ALT 25 06/05/2014       CBC RESULTS:  Lab Results   Component Value Date    WBC 6.8 01/23/2023    WBC 14.3 (H) 05/11/2016    RBC 4.43 01/23/2023    RBC 4.02 05/11/2016    HGB 14.0 01/23/2023    HGB 12.7 05/11/2016    HCT 42.0 01/23/2023    HCT 38.5 05/11/2016    MCV 95 01/23/2023    MCV 96 05/11/2016    MCH 31.6 01/23/2023    MCH 31.6 05/11/2016    MCHC 33.3 01/23/2023    MCHC 33.0 05/11/2016    RDW 12.2 01/23/2023    RDW 12.0 05/11/2016     01/23/2023     05/11/2016       BMP RESULTS:  Lab Results   Component Value Date     (L) 01/22/2023     05/11/2016    POTASSIUM 4.4 01/22/2023    POTASSIUM 3.6 05/11/2016    CHLORIDE 102 01/22/2023    CHLORIDE 101 05/11/2016    CO2 26 01/22/2023    CO2 30 05/11/2016    ANIONGAP 7 01/22/2023    ANIONGAP 5 05/11/2016     (H) 01/22/2023     (H) 05/11/2016    BUN 14.5 01/22/2023    BUN 9 05/11/2016    CR 0.70 01/22/2023    CR 0.63 05/11/2016    GFRESTIMATED >90 01/22/2023    GFRESTIMATED >90  Non  GFR Calc   05/11/2016    GFRESTBLACK >90   GFR Calc   05/11/2016    VETO 9.3 01/22/2023    VETO 9.1 05/11/2016        A1C RESULTS:  Lab Results   Component Value Date    A1C 5.4 01/20/2023       INR RESULTS:  No results found for: INR      Thank you for  allowing me to participate in the care of your patient.      Sincerely,     Jewell Garcia MD     [facility]  cc:   Alayna Avina PA-C  [address]  [place] [id number]

## 2023-06-28 ENCOUNTER — TELEPHONE (OUTPATIENT)
Dept: CARDIOLOGY | Facility: CLINIC | Age: 63
End: 2023-06-28
Payer: COMMERCIAL

## 2023-06-28 NOTE — TELEPHONE ENCOUNTER
Called pt, no answer. Left detailed VM advising prasugrel (Effient) was discontinued at last visit with Dr. Garcia and changed to clopidogrel. Left call back number for Team 1 if any further questions.

## 2023-06-28 NOTE — TELEPHONE ENCOUNTER
Health Call Center    Phone Message    May a detailed message be left on voicemail: yes     Reason for Call: Medication Question or concern regarding medication   Prescription Clarification  Name of Medication: clopidogrel (PLAVIX) 75 MG tablet  Prescribing Provider: Jose   Pharmacy:    What on the order needs clarification?     Patient would like a call back regarding this medication. Patient would like to know that medication was discounted to be able to start this medication.      Action Taken: Other: Cardiology     Travel Screening: Not Applicable     Thank you!  [facility]

## 2023-08-07 ENCOUNTER — TELEPHONE (OUTPATIENT)
Dept: CARDIOLOGY | Facility: CLINIC | Age: 63
End: 2023-08-07
Payer: COMMERCIAL

## 2023-08-07 NOTE — TELEPHONE ENCOUNTER
[facility] Call Center    Phone Message    May a detailed message be left on voicemail: yes     Reason for Call: Other: patient would like a call from her care team as she is confused on what medications she should be taking daily, patient said that the care team switched one of her medication to a different one and she isnt sure what one it is and is completely lost on what she needs to be taking and what she doesn't need to take, please call patient to advise, thank you.     Action Taken: Other: cardiology    Travel Screening: Not Applicable                                               Thank you!  [facility]

## 2023-08-08 NOTE — TELEPHONE ENCOUNTER
Called back to pt, reviewed plan per Dr. Garcia's last note on 6/15/23:     Stop prasugrel (due to her low body weight and significant nuisance bruising)  Start clopidogrel 300mg loading dose, then 75 mg PO daily.  Stop low-dose lisinopril. BP is well-controlled, EF has normalized and she has fatigue.  If she continues to have low normal BP and fatigue could decrease metoprolol to 12.5mg PO daily.  Absolute smoking cessation strongly recommended.   Follow up in Jan 2024 (one yr post MI).     Pt verbalized understanding and stated she stopped prasugrel and started taking clopidogrel as instructed. Pt confirmed she discontinued lisinopril as well. Pt stated she just wanted to make sure she was taking the correct medications. No further questions at this time.

## 2023-08-22 ENCOUNTER — HOSPITAL ENCOUNTER (EMERGENCY)
Facility: CLINIC | Age: 63
Discharge: HOME | End: 2023-08-22
Attending: EMERGENCY MEDICINE | Admitting: EMERGENCY MEDICINE
Payer: COMMERCIAL

## 2023-08-22 VITALS
SYSTOLIC BLOOD PRESSURE: 145 MMHG | RESPIRATION RATE: 18 BRPM | TEMPERATURE: 98.1 F | WEIGHT: 111 LBS | BODY MASS INDEX: 17.92 KG/M2 | HEART RATE: 86 BPM | DIASTOLIC BLOOD PRESSURE: 79 MMHG | OXYGEN SATURATION: 96 %

## 2023-08-22 DIAGNOSIS — U07.1 COVID-19 VIRUS INFECTION: ICD-10-CM

## 2023-08-22 LAB — SARS-COV-2 RNA RESP QL NAA+PROBE: POSITIVE

## 2023-08-22 PROCEDURE — 87635 SARS-COV-2 COVID-19 AMP PRB: CPT | Performed by: EMERGENCY MEDICINE

## 2023-08-22 PROCEDURE — 250N000013 HC RX MED GY IP 250 OP 250 PS 637: Performed by: EMERGENCY MEDICINE

## 2023-08-22 PROCEDURE — 99283 EMERGENCY DEPT VISIT LOW MDM: CPT

## 2023-08-22 RX ORDER — ACETAMINOPHEN 500 MG
1000 TABLET ORAL ONCE
Status: COMPLETED | OUTPATIENT
Start: 2023-08-22 | End: 2023-08-22

## 2023-08-22 RX ADMIN — ACETAMINOPHEN 1000 MG: 500 TABLET, FILM COATED ORAL at 10:56

## 2023-08-22 NOTE — DISCHARGE INSTRUCTIONS
We suspect the episode of confusion this morning was related to high fever.  Since you have returned to normal and your COVID test is positive we think it safe to send you home if your confusion is constant or progressively worse or you develop worsening shortness of breath the ER is always here to see you.  Due to your lack of full immunization for COVID as well as your cardiac condition as well as your age we are recommending Paxlovid to prevent prevent progression of the disease.  Continue oral hydration and antifever medications.    If you do take Paxil with peds please stop your statin drug while you are taking Paxlovid.

## 2023-08-22 NOTE — ED TRIAGE NOTES
Exposed to covid 3 days ago.  Last night lethargic, feverish, nonproductive cough.  Taking Tylenol at home.  Denies rash

## 2023-08-22 NOTE — ED PROVIDER NOTES
History   Chief Complaint:  Altered Mental Status     HPI   Linda Guadalupe is a 63 year old female with history of NSTEMI s/p PCI stenting, hypertensive urgency presenting to the emergency department for evaluation of confusion. Linda explains that she woke yesterday with malaise and feeling feverish. She was unable to get her shift covered at work and felt extremely fatigued after work. Her family reports confusion baseline onset today. Linda additionally reports cough. She notes increased stress as of recently. She is a smoker.     Independent Historian:   Linda's family supplemented history above.     Review of External Notes:     Medications:    Albuterol   Aspirin   Plavix   Metoprolol   Nitroglycerin   Percocet   Rosuvastatin     Past Medical History:    NSTEMI  Neuropathy   Hypertensive urgency     Past Surgical History:    Coronary angiogram   Intravascular US  PCI stent drug eluting   Right shoulder surgery     Physical Exam   Patient Vitals for the past 24 hrs:   BP Temp Temp src Pulse Resp SpO2 Weight   08/22/23 1546 -- 98.1  F (36.7  C) Oral 86 18 96 % --   08/22/23 1034 (!) 145/79 (!) 103  F (39.4  C) Temporal 109 16 93 % 50.3 kg (111 lb)     Physical Exam  Vitals and nursing note reviewed.   Eyes:      Extraocular Movements: Extraocular movements intact.   Cardiovascular:      Rate and Rhythm: Normal rate.   Pulmonary:      Effort: Pulmonary effort is normal.   Musculoskeletal:         General: Normal range of motion.      Cervical back: Normal range of motion.   Skin:     General: Skin is warm.   Neurological:      Mental Status: She is alert and oriented to person, place, and time.      GCS: GCS eye subscore is 4. GCS verbal subscore is 5. GCS motor subscore is 5.   Psychiatric:         Mood and Affect: Mood normal.           Emergency Department Course     Laboratory:  Labs Ordered and Resulted from Time of ED Arrival to Time of ED Departure   COVID-19 VIRUS (CORONAVIRUS) BY PCR - Abnormal        Result Value    SARS CoV2 PCR Positive (*)      Emergency Department Course & Assessments:    Interventions:  Medications   acetaminophen (TYLENOL) tablet 1,000 mg (1,000 mg Oral $Given 8/22/23 1056)     Assessments:  1509 I obtained history and examined the patient as noted above.    1539 I rechecked the patient. Patient reports she had the two initial COVID vaccinations. She has not had any of the boosters. Vitals have returned to normal. I discussed findings and discharge with the patient. Will prescribe Paxlovid and patient can decide whether she would like to take it. All questions answered.     Independent Interpretation (X-rays, CTs, rhythm strip):  None    Consultations/Discussion of Management or Tests:  None     Social Determinants of Health affecting care:   None    Disposition:  The patient was discharged to home.     Impression & Plan      Medical Decision Making:  Patient is a well-appearing 63-year-old female who arrives with fever to 103.  Patient was noted to be somewhat confused last night by family but after antipyretics at triage patient seems to be back to baseline.  Vitals have stabilized from a fever after Tylenol to normothermia and normal heart rate normal blood pressure and normal respiratory rate.  COVID testing is positive and there is a history of recent COVID exposure.  No concerns for sepsis.  I did consider Paxlovid.  Unfortunately to this physician's opinion there is no clear evidence to support or refute the use of Paxlovid in her age group with her underlying coronary artery disease smoking history as well as lack of full vaccination.  I am not convinced that this medication is necessarily beneficial and may cause rebound COVID but only felt like due to patient's chronic health conditions recommended.  Treatment as she is at risk for complications.  Care was discussed with the patient was recommended to discontinue a statin and start Paxlovid or not take any medications including  antifever medications and oral hydration.  No lab work or x-ray were performed as patient was waited 5-1/2 hours in triage before being admitted to the ER for assessment by me.  On my arrival to the room patient is asymptomatic vitals have normalized and do not feel further testing will benefit the patient at this time as her symptoms have resolved after Tylenol.    Diagnosis:    ICD-10-CM    1. COVID-19 virus infection  U07.1         Discharge Medications:  New Prescriptions    NIRMATRELVIR AND RITONAVIR (PAXLOVID) 150 MG/100 MG THERAPY PACK    Take 2 tablets by mouth 2 times daily for 5 days      Scribe Disclosure:  Trang PERERA, am serving as a scribe at 3:19 PM on 8/22/2023 to document services personally performed by Anastacio Ruano MD based on my observations and the provider's statements to me.     8/22/2023   Anastacio Runao MD Goodman, Brian Samuel, MD  08/23/23 4178

## 2023-08-22 NOTE — ED NOTES
Bed: ED31  Expected date:   Expected time:   Means of arrival:   Comments:  Linda correa triage if appropriate ?

## 2024-01-26 ENCOUNTER — OFFICE VISIT (OUTPATIENT)
Dept: CARDIOLOGY | Facility: CLINIC | Age: 64
End: 2024-01-26
Payer: COMMERCIAL

## 2024-01-26 VITALS
SYSTOLIC BLOOD PRESSURE: 111 MMHG | BODY MASS INDEX: 20.28 KG/M2 | WEIGHT: 121.7 LBS | DIASTOLIC BLOOD PRESSURE: 72 MMHG | HEART RATE: 87 BPM | HEIGHT: 65 IN

## 2024-01-26 DIAGNOSIS — I25.5 ISCHEMIC CARDIOMYOPATHY: ICD-10-CM

## 2024-01-26 DIAGNOSIS — I25.10 CORONARY ARTERY DISEASE INVOLVING NATIVE CORONARY ARTERY OF NATIVE HEART WITHOUT ANGINA PECTORIS: Primary | ICD-10-CM

## 2024-01-26 DIAGNOSIS — E78.5 HYPERLIPIDEMIA WITH TARGET LDL LESS THAN 70: ICD-10-CM

## 2024-01-26 DIAGNOSIS — F17.200 TOBACCO USE DISORDER: ICD-10-CM

## 2024-01-26 PROCEDURE — 99214 OFFICE O/P EST MOD 30 MIN: CPT | Performed by: NURSE PRACTITIONER

## 2024-01-26 NOTE — PROGRESS NOTES
CARDIOLOGY CLINIC NOTE    PRIMARY CARDIOLOGIST  Dr. Garcia     PRIMARY CARE PHYSICIAN:  Marquis Wright    HISTORY OF PRESENT ILLNESS:  Linda Guadalupe is a very pleasant 63-year-old female with a past medical history significant for non-STEMI status post JOYCE to the proximal LAD, hyperlipidemia, tobacco abuse, chronic back pain, and family history of premature CAD.    On 1/20/2023, she presented to [facility] emergency room with a non-STEMI.  She subsequently underwent a coronary angiogram that showed 80% stenosis to the proximal LAD followed by a 3.0 x 20 mm drug-eluting stent.  There was mild nonobstructive CAD elsewhere.  Echocardiogram showed a mildly reduced ejection fraction estimated at 40 to 45% with moderate to severe hypokinesis of the mid anterior and anterior septal wall and all apical segments of the LV.  There was trace to mild mitral and tricuspid regurgitation.  She was initiated on dual antiplatelet therapy, metoprolol, lisinopril and rosuvastatin.      Follow-up echocardiogram on 5/31/2023 showed a normalized ejection fraction now 55 to 60%, no wall motion abnormalities and mild tricuspid regurgitation.     At her follow-up office visit with Dr. Garcia, she complained of significant bruising on Effient and was transition to clopidogrel.  Lisinopril was discontinued due to fatigue and now normalized ejection fraction.    She returns to the office today for an annual follow-up.  Overall, she is feeling well on a cardiac standpoint, denies chest pain, shortness of breath, palpitations, PND, orthopnea, presyncope, syncope, or lower extremity edema.  Upon exam, lungs are clear bilaterally, heart rate and rhythm regular, and no evidence of fluid overload.    Blood pressure is well-controlled at 111/72  Weight stable at 121 pounds  Last lipid panel showed a total cholesterol 146, HDL 62, LDL 67, and triglycerides 85    Unfortunately, she continues to smoke 4 cigarettes/day.  She is  working with her primary care physician who recently initiated a nicotine patch and committed to quit.    She is very active as a  in a local restaurant where she  > 12,000 steps per day.  Compliant with all medications    PAST MEDICAL HISTORY:  Past Medical History:   Diagnosis Date    Tobacco abuse 11/13/2012       MEDICATIONS:  Current Outpatient Medications   Medication    albuterol (PROAIR HFA/PROVENTIL HFA/VENTOLIN HFA) 108 (90 Base) MCG/ACT inhaler    aspirin (ASA) 81 MG EC tablet    metoprolol succinate ER (TOPROL XL) 25 MG 24 hr tablet    nitroGLYcerin (NITROSTAT) 0.4 MG sublingual tablet    oxyCODONE-acetaminophen (PERCOCET) 5-325 MG per tablet    rosuvastatin (CRESTOR) 20 MG tablet     No current facility-administered medications for this visit.       SOCIAL HISTORY:  I have reviewed this patient's social history and updated it with pertinent information if needed. Linda Guadalupe  reports that she has been smoking cigarettes. She has never used smokeless tobacco. She reports current alcohol use. She reports that she does not use drugs.    PHYSICAL EXAM:  Pulse:  [87] 87  BP: (111)/(72) 111/72  121 lbs 11.2 oz    Constitutional: alert, no distress  Respiratory: Good bilateral air entry  Cardiovascular: Regular rate and rhythm  GI: nondistended  Neuropsychiatric: appropriate affact    ASSESSMENT/PLAN:  Pertinent issues addressed/ reviewed during this cardiology visit    Coronary artery disease -non-STEMI (1/2023) status post JOYCE to proximal LAD, mild nonobstructive disease elsewhere.  She has completed 12 months of uninterrupted dual antiplatelet therapy, okay to stop Plavix and continue on aspirin indefinitely.  Continue metoprolol and statin.  Encourage continued exercise and heart healthy diet.  Ischemic cardiomyopathy -now normalized.  Last echocardiogram on 5/31/2023 showed a normalized ejection fraction now 55 to 60%, no wall motion abnormalities and mild tricuspid regurgitation.   Hyperlipidemia  -LDL at goal, continue rosuvastatin.  Due for fasting lipid panel in May   Tobacco abuse -currently smokes 4 cigarettes/day, working with PCP on smoking cessation.    Follow-up in 1 year with Dr. Garcia     It was a pleasure seeing this patient in clinic today. Please do not hesitate to contact me with any future questions.     RHONA Sanchez, CNP  Cardiology - [facility]  01/26/2024    Review of the result(s) of each unique test - Last echocardiogram, lipid panel, coronary angiogram     The level of medical decision making during this visit was of moderate complexity.    This note was completed in part using dictation via the Dragon voice recognition software. Some word and grammatical errors may occur and must be interpreted in the appropriate clinical context.  If there are any questions pertaining to this issue, please contact me for further clarification.

## 2024-01-26 NOTE — LETTER
1/26/2024    Marquis Wright MD  [address] [address]    RE: Linda Guadalupe       Dear Colleague,     I had the pleasure of seeing Linda Guadalupe in the [facility].  CARDIOLOGY CLINIC NOTE    PRIMARY CARDIOLOGIST  Dr. Garcia     PRIMARY CARE PHYSICIAN:  Marquis Wright    HISTORY OF PRESENT ILLNESS:  Linda Guadalupe is a very pleasant 63-year-old female with a past medical history significant for non-STEMI status post JOYCE to the proximal LAD, hyperlipidemia, tobacco abuse, chronic back pain, and family history of premature CAD.    On 1/20/2023, she presented to [facility] emergency room with a non-STEMI.  She subsequently underwent a coronary angiogram that showed 80% stenosis to the proximal LAD followed by a 3.0 x 20 mm drug-eluting stent.  There was mild nonobstructive CAD elsewhere.  Echocardiogram showed a mildly reduced ejection fraction estimated at 40 to 45% with moderate to severe hypokinesis of the mid anterior and anterior septal wall and all apical segments of the LV.  There was trace to mild mitral and tricuspid regurgitation.  She was initiated on dual antiplatelet therapy, metoprolol, lisinopril and rosuvastatin.      Follow-up echocardiogram on 5/31/2023 showed a normalized ejection fraction now 55 to 60%, no wall motion abnormalities and mild tricuspid regurgitation.     At her follow-up office visit with Dr. Garcia, she complained of significant bruising on Effient and was transition to clopidogrel.  Lisinopril was discontinued due to fatigue and now normalized ejection fraction.    She returns to the office today for an annual follow-up.  Overall, she is feeling well on a cardiac standpoint, denies chest pain, shortness of breath, palpitations, PND, orthopnea, presyncope, syncope, or lower extremity edema.  Upon exam, lungs are clear bilaterally, heart rate and rhythm regular, and no evidence of fluid  overload.    Blood pressure is well-controlled at 111/72  Weight stable at 121 pounds  Last lipid panel showed a total cholesterol 146, HDL 62, LDL 67, and triglycerides 85    Unfortunately, she continues to smoke 4 cigarettes/day.  She is working with her primary care physician who recently initiated a nicotine patch and committed to quit.    She is very active as a  in a local restaurant where she  > 12,000 steps per day.  Compliant with all medications    PAST MEDICAL HISTORY:  Past Medical History:   Diagnosis Date    Tobacco abuse 11/13/2012       MEDICATIONS:  Current Outpatient Medications   Medication    albuterol (PROAIR HFA/PROVENTIL HFA/VENTOLIN HFA) 108 (90 Base) MCG/ACT inhaler    aspirin (ASA) 81 MG EC tablet    metoprolol succinate ER (TOPROL XL) 25 MG 24 hr tablet    nitroGLYcerin (NITROSTAT) 0.4 MG sublingual tablet    oxyCODONE-acetaminophen (PERCOCET) 5-325 MG per tablet    rosuvastatin (CRESTOR) 20 MG tablet     No current facility-administered medications for this visit.       SOCIAL HISTORY:  I have reviewed this patient's social history and updated it with pertinent information if needed. Linda ARACELIS Guadalupe  reports that she has been smoking cigarettes. She has never used smokeless tobacco. She reports current alcohol use. She reports that she does not use drugs.    PHYSICAL EXAM:  Pulse:  [87] 87  BP: (111)/(72) 111/72  121 lbs 11.2 oz    Constitutional: alert, no distress  Respiratory: Good bilateral air entry  Cardiovascular: Regular rate and rhythm  GI: nondistended  Neuropsychiatric: appropriate affact    ASSESSMENT/PLAN:  Pertinent issues addressed/ reviewed during this cardiology visit    Coronary artery disease -non-STEMI (1/2023) status post JOYCE to proximal LAD, mild nonobstructive disease elsewhere.  She has completed 12 months of uninterrupted dual antiplatelet therapy, okay to stop Plavix and continue on aspirin indefinitely.  Continue metoprolol and statin.  Encourage continued  exercise and heart healthy diet.  Ischemic cardiomyopathy -now normalized.  Last echocardiogram on 5/31/2023 showed a normalized ejection fraction now 55 to 60%, no wall motion abnormalities and mild tricuspid regurgitation.   Hyperlipidemia -LDL at goal, continue rosuvastatin.  Due for fasting lipid panel in May   Tobacco abuse -currently smokes 4 cigarettes/day, working with PCP on smoking cessation.    Follow-up in 1 year with Dr. Garcia     It was a pleasure seeing this patient in clinic today. Please do not hesitate to contact me with any future questions.     RHONA Sanchez, CNP  Cardiology - [facility]  01/26/2024    Review of the result(s) of each unique test - Last echocardiogram, lipid panel, coronary angiogram     The level of medical decision making during this visit was of moderate complexity.    This note was completed in part using dictation via the Dragon voice recognition software. Some word and grammatical errors may occur and must be interpreted in the appropriate clinical context.  If there are any questions pertaining to this issue, please contact me for further clarification.      Thank you for allowing me to participate in the care of your patient.      Sincerely,     RHONA Sanchez CNP     [facility]  cc:   No referring provider defined for this encounter.

## 2024-01-26 NOTE — PATIENT INSTRUCTIONS
Thanks for participating in a office visit with the [facility] clinic today.    Doing well on a cardiac standpoint  You have completed 1 year of clopidogrel and aspirin. Ok to stop plavix and continue on aspirin indefinitely.   Last echocardiogram showed complete recovery of heart muscle and wall motion.  Continue metoprolol   Continue rosuvastatin.   Continue exercise  Stop smoking   Follow up fasting lipid panel in May     Follow up in 1 year with Dr. Garcia.     Please call my nurse at  [phone] with any questions or concerns.    Scheduling phone number: [phone]  Reminder: Please bring in all current medications, over the counter supplements and vitamin bottles to your next appointment.

## 2024-02-07 DIAGNOSIS — I21.4 NSTEMI (NON-ST ELEVATED MYOCARDIAL INFARCTION) (H): ICD-10-CM

## 2024-02-07 DIAGNOSIS — I25.5 ISCHEMIC CARDIOMYOPATHY: ICD-10-CM

## 2024-02-07 RX ORDER — METOPROLOL SUCCINATE 25 MG/1
25 TABLET, EXTENDED RELEASE ORAL DAILY
Qty: 90 TABLET | Refills: 4 | Status: SHIPPED | OUTPATIENT
Start: 2024-02-07 | End: 2024-02-29

## 2024-02-19 DIAGNOSIS — I21.4 NSTEMI (NON-ST ELEVATED MYOCARDIAL INFARCTION) (H): ICD-10-CM

## 2024-02-19 RX ORDER — ASPIRIN 81 MG/1
81 TABLET ORAL DAILY
Qty: 90 TABLET | Refills: 4 | Status: SHIPPED | OUTPATIENT
Start: 2024-02-19

## 2024-02-29 DIAGNOSIS — I21.4 NSTEMI (NON-ST ELEVATED MYOCARDIAL INFARCTION) (H): ICD-10-CM

## 2024-02-29 DIAGNOSIS — I25.5 ISCHEMIC CARDIOMYOPATHY: ICD-10-CM

## 2024-02-29 RX ORDER — METOPROLOL SUCCINATE 25 MG/1
25 TABLET, EXTENDED RELEASE ORAL DAILY
Qty: 90 TABLET | Refills: 4 | Status: SHIPPED | OUTPATIENT
Start: 2024-02-29

## 2024-03-06 DIAGNOSIS — I21.4 NSTEMI (NON-ST ELEVATED MYOCARDIAL INFARCTION) (H): ICD-10-CM

## 2024-03-06 RX ORDER — ROSUVASTATIN CALCIUM 20 MG/1
20 TABLET, COATED ORAL DAILY
Qty: 90 TABLET | Refills: 3 | Status: SHIPPED | OUTPATIENT
Start: 2024-03-06

## 2024-03-06 NOTE — PROGRESS NOTES
[facility] Cardiology Refill Guideline reviewed.  Medication meets criteria for refill.  Celena Meraz RN on 3/6/2024 at 12:02 PM

## 2024-08-11 ENCOUNTER — HEALTH MAINTENANCE LETTER (OUTPATIENT)
Age: 64
End: 2024-08-11

## 2024-10-22 DIAGNOSIS — I21.4 NSTEMI (NON-ST ELEVATED MYOCARDIAL INFARCTION) (H): ICD-10-CM

## 2024-10-22 RX ORDER — ASPIRIN 81 MG/1
81 TABLET ORAL DAILY
Qty: 90 TABLET | Refills: 0 | Status: SHIPPED | OUTPATIENT
Start: 2024-10-22

## 2025-03-02 ENCOUNTER — MYC REFILL (OUTPATIENT)
Dept: CARDIOLOGY | Facility: CLINIC | Age: 65
End: 2025-03-02
Payer: COMMERCIAL

## 2025-03-02 DIAGNOSIS — I25.5 ISCHEMIC CARDIOMYOPATHY: ICD-10-CM

## 2025-03-02 DIAGNOSIS — I21.4 NSTEMI (NON-ST ELEVATED MYOCARDIAL INFARCTION) (H): ICD-10-CM

## 2025-03-03 RX ORDER — ASPIRIN 81 MG/1
81 TABLET ORAL DAILY
Qty: 90 TABLET | Refills: 0 | Status: SHIPPED | OUTPATIENT
Start: 2025-03-03

## 2025-03-03 RX ORDER — ROSUVASTATIN CALCIUM 20 MG/1
20 TABLET, COATED ORAL DAILY
Qty: 90 TABLET | Refills: 0 | Status: SHIPPED | OUTPATIENT
Start: 2025-03-03

## 2025-03-03 RX ORDER — METOPROLOL SUCCINATE 25 MG/1
25 TABLET, EXTENDED RELEASE ORAL DAILY
Qty: 90 TABLET | Refills: 0 | Status: SHIPPED | OUTPATIENT
Start: 2025-03-03

## 2025-04-06 ENCOUNTER — MYC REFILL (OUTPATIENT)
Dept: CARDIOLOGY | Facility: CLINIC | Age: 65
End: 2025-04-06
Payer: COMMERCIAL

## 2025-04-06 DIAGNOSIS — I25.5 ISCHEMIC CARDIOMYOPATHY: ICD-10-CM

## 2025-04-06 DIAGNOSIS — I21.4 NSTEMI (NON-ST ELEVATED MYOCARDIAL INFARCTION) (H): ICD-10-CM

## 2025-04-07 RX ORDER — ROSUVASTATIN CALCIUM 20 MG/1
20 TABLET, COATED ORAL DAILY
Qty: 90 TABLET | Refills: 0 | Status: SHIPPED | OUTPATIENT
Start: 2025-04-07

## 2025-04-07 RX ORDER — METOPROLOL SUCCINATE 25 MG/1
25 TABLET, EXTENDED RELEASE ORAL DAILY
Qty: 90 TABLET | Refills: 0 | Status: SHIPPED | OUTPATIENT
Start: 2025-04-07

## 2025-04-07 RX ORDER — ASPIRIN 81 MG/1
81 TABLET ORAL DAILY
Qty: 90 TABLET | Refills: 0 | Status: SHIPPED | OUTPATIENT
Start: 2025-04-07

## 2025-04-21 ENCOUNTER — LAB (OUTPATIENT)
Dept: LAB | Facility: CLINIC | Age: 65
End: 2025-04-21
Payer: COMMERCIAL

## 2025-04-21 DIAGNOSIS — I21.4 NSTEMI (NON-ST ELEVATED MYOCARDIAL INFARCTION) (H): ICD-10-CM

## 2025-04-21 LAB
ALT SERPL W P-5'-P-CCNC: 26 U/L (ref 0–50)
ANION GAP SERPL CALCULATED.3IONS-SCNC: 11 MMOL/L (ref 7–15)
BUN SERPL-MCNC: 10.7 MG/DL (ref 8–23)
CALCIUM SERPL-MCNC: 9.6 MG/DL (ref 8.8–10.4)
CHLORIDE SERPL-SCNC: 101 MMOL/L (ref 98–107)
CHOLEST SERPL-MCNC: 149 MG/DL
CREAT SERPL-MCNC: 0.68 MG/DL (ref 0.51–0.95)
EGFRCR SERPLBLD CKD-EPI 2021: >90 ML/MIN/1.73M2
FASTING STATUS PATIENT QL REPORTED: YES
GLUCOSE SERPL-MCNC: 122 MG/DL (ref 70–99)
HCO3 SERPL-SCNC: 30 MMOL/L (ref 22–29)
HDLC SERPL-MCNC: 72 MG/DL
LDLC SERPL CALC-MCNC: 56 MG/DL
NONHDLC SERPL-MCNC: 77 MG/DL
POTASSIUM SERPL-SCNC: 5.1 MMOL/L (ref 3.4–5.3)
SODIUM SERPL-SCNC: 142 MMOL/L (ref 135–145)
TRIGL SERPL-MCNC: 104 MG/DL

## 2025-04-21 PROCEDURE — 36415 COLL VENOUS BLD VENIPUNCTURE: CPT | Performed by: NURSE PRACTITIONER

## 2025-04-21 PROCEDURE — 84460 ALANINE AMINO (ALT) (SGPT): CPT | Performed by: NURSE PRACTITIONER

## 2025-04-21 PROCEDURE — 80061 LIPID PANEL: CPT | Performed by: NURSE PRACTITIONER

## 2025-04-21 PROCEDURE — 80048 BASIC METABOLIC PNL TOTAL CA: CPT | Performed by: NURSE PRACTITIONER

## 2025-04-23 ENCOUNTER — OFFICE VISIT (OUTPATIENT)
Dept: CARDIOLOGY | Facility: CLINIC | Age: 65
End: 2025-04-23
Payer: COMMERCIAL

## 2025-04-23 VITALS
WEIGHT: 105.4 LBS | DIASTOLIC BLOOD PRESSURE: 79 MMHG | SYSTOLIC BLOOD PRESSURE: 122 MMHG | HEIGHT: 65 IN | HEART RATE: 67 BPM | BODY MASS INDEX: 17.56 KG/M2

## 2025-04-23 DIAGNOSIS — I25.5 ISCHEMIC CARDIOMYOPATHY: ICD-10-CM

## 2025-04-23 DIAGNOSIS — I21.4 NSTEMI (NON-ST ELEVATED MYOCARDIAL INFARCTION) (H): ICD-10-CM

## 2025-04-23 DIAGNOSIS — I25.10 CORONARY ARTERY DISEASE INVOLVING NATIVE CORONARY ARTERY OF NATIVE HEART WITHOUT ANGINA PECTORIS: Primary | ICD-10-CM

## 2025-04-23 DIAGNOSIS — E78.5 HYPERLIPIDEMIA WITH TARGET LDL LESS THAN 70: ICD-10-CM

## 2025-04-23 RX ORDER — ROSUVASTATIN CALCIUM 20 MG/1
20 TABLET, COATED ORAL DAILY
Qty: 90 TABLET | Refills: 0 | Status: SHIPPED | OUTPATIENT
Start: 2025-04-23

## 2025-04-23 RX ORDER — NITROGLYCERIN 0.4 MG/1
TABLET SUBLINGUAL
Qty: 25 TABLET | Refills: 1 | Status: SHIPPED | OUTPATIENT
Start: 2025-04-23

## 2025-04-23 RX ORDER — METOPROLOL SUCCINATE 25 MG/1
25 TABLET, EXTENDED RELEASE ORAL DAILY
Qty: 90 TABLET | Refills: 0 | Status: SHIPPED | OUTPATIENT
Start: 2025-04-23

## 2025-04-23 RX ORDER — ASPIRIN 81 MG/1
81 TABLET ORAL DAILY
Qty: 90 TABLET | Refills: 0 | Status: SHIPPED | OUTPATIENT
Start: 2025-04-23

## 2025-04-23 NOTE — PROGRESS NOTES
~Cardiology Clinic Visit~    Primary Cardiologist: Jose  Reason for visit: Annual cardiology follow-up      Linda Guadalupe MRN# [id number]   YOB: 1960 Age: 65 year old       HPI:    Linda Guadalupe is a delightful 65 year old patient with past medical history significant for:    Coronary artery disease with history of anterior MI in 1/2023 s/p JOYCE x 1  Hypertension  Hyperlipidemia  Tobacco smoker    I had the opportunity to see Linda Guadalupe for cardiology follow up. In review, the patient has a history of NSTEMI in January 2023.  An angiogram at that time revealed vessel obstructive coronary disease, 80% proximal LAD gnosis.  Mild, nonobstructive coronary disease elsewhere.  She received a single proximal LAD.  An echocardiogram initially showed LVEF 40-45%.  A repeat echo in 5/2023 showed LVEF 55-60% with no wall motion abnormalities.    At her follow-up office visit with Dr. Garcia, she complained of significant bruising on Effient and was transition to clopidogrel.  Lisinopril was discontinued due to fatigue and now normalized ejection fraction.     When seen last by my colleague Aida Palacios CNP on 1/26/2024 she was feeling well from a cardiac standpoint and had no concerning anginal or heart failure symptoms.  Her blood pressure was well-controlled goal.  Unfortunately at that time she continued to smoke 4 cigarettes/day.  She was working with her primary care provider to quit smoking.      Since seen last, Linda overall feels well from a cardiac standpoint. She denies chest pain, pressure, or tightness. No shortness of breath or dyspnea on exertion. No lower extremity swelling, orthopnea, or PND. No lightheadedness, dizziness, or syncope. No palpitations or racing heart. No blood in urine or stool. 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 Recent unintentional weight loss-  historically weight has been very stable around 115 lbs, 105 lbs today.     Diagnotic studies:  Echocardiogram 5/31/2023:  1. Normal biventricular size and function. Left ventricular ejection fraction  of 55-60%.  2. No segmental wall motion abnormalities noted.  3. There is mild (1+) tricuspid regurgitation.  Compared to the previous echocardiogram on 1/21/2023. LV systolic funciton is  normal today. WMA are no longer present.  Coronary angiogram 1/23/2023:  1.  Single-vessel obstructive CAD (80% proximal LAD stenosis)  2.  Mild, nonobstructive CAD elsewhere   3.  Successful HD IVUS guided PCI of the proximal LAD with placement of a single 3.0 x 20 mm Synergy JOYCE, postdilated with a 3.5 mm NC balloon      Assessment:  Coronary artery disease with history of anterior MI in 1/2023 s/p JOYCE x 1  No anginal symptoms  Aspirin 81 mg, rosuvastatin 20 mg  PRN sl nitroglycerin  Hypertension  Well controlled with metoprolol succinate 25 mg daily  Hyperlipidemia  Lipid panel 4/21/2025: cholesterol 147, HDL, 72, LDL 56,   Rosuvastatin 20 mg  Tobacco smoker  Unintentional weight loss      Plan:   It was my pleasure to see Linda for cardiology follow up. Overall, she is doing quite well and has no concerning anginal or heart failure symptoms. We reviewed her recent lab work which shows stable kidney function and cholesterol at goal. Continue rosuvastatin 20 mg. Repeat FLP/ALT next year.   Blood pressure well controlled, continue metoprolol 25 mg daily    We discussed optimizing heart health through regular aerobic exercise, Mediterranean diet, and smoking cessation.   Encouraged to follow up with PCP regarding recent unintentional weight loss  Follow up with Dr. Garcia in 1 year, sooner if any new concerns     Thank you for the opportunity to participate in this pleasant patient's care.    We would be happy to see this patient sooner for any concerns in the meantime.    RHONA Mckee, CNP   Nurse Practitioner  [facility]    A total of 33 minutes was spent with patient, on chart review and documentation today.         Orders Placed This Encounter   Procedures    Basic metabolic panel    Lipid Profile    ALT    Follow-Up with Cardiology     Orders Placed This Encounter   Medications    metoprolol succinate ER (TOPROL XL) 25 MG 24 hr tablet     Sig: Take 1 tablet (25 mg) by mouth daily.     Dispense:  90 tablet     Refill:  0    nitroGLYcerin (NITROSTAT) 0.4 MG sublingual tablet     Sig: For chest pain place 1 tablet under the tongue every 5 minutes for 3 doses. If symptoms persist 5 minutes after 2nd dose call 911.     Dispense:  25 tablet     Refill:  1    rosuvastatin (CRESTOR) 20 MG tablet     Sig: Take 1 tablet (20 mg) by mouth daily.     Dispense:  90 tablet     Refill:  0    aspirin 81 MG EC tablet     Sig: Take 1 tablet (81 mg) by mouth daily.     Dispense:  90 tablet     Refill:  0     Medications Discontinued During This Encounter   Medication Reason    nitroGLYcerin (NITROSTAT) 0.4 MG sublingual tablet Reorder (No AVS)    metoprolol succinate ER (TOPROL XL) 25 MG 24 hr tablet Reorder (No AVS)    rosuvastatin (CRESTOR) 20 MG tablet Reorder (No AVS)    aspirin 81 MG EC tablet Reorder (No AVS)         Encounter Diagnoses   Name Primary?    NSTEMI (non-ST elevated myocardial infarction) (H)     Ischemic cardiomyopathy     Coronary artery disease involving native coronary artery of native heart without angina pectoris Yes    Hyperlipidemia with target LDL less than 70        CURRENT MEDICATIONS:  Current Outpatient Medications   Medication Sig Dispense Refill    albuterol (PROAIR HFA/PROVENTIL HFA/VENTOLIN HFA) 108 (90 Base) MCG/ACT inhaler Inhale 1-2 puffs into the lungs every 4 hours as needed for shortness of breath or wheezing      aspirin 81 MG EC tablet Take 1 tablet (81 mg) by mouth daily. 90 tablet 0    metoprolol succinate ER (TOPROL XL) 25 MG 24 hr tablet Take 1 tablet (25 mg) by mouth  daily. 90 tablet 0    nitroGLYcerin (NITROSTAT) 0.4 MG sublingual tablet For chest pain place 1 tablet under the tongue every 5 minutes for 3 doses. If symptoms persist 5 minutes after 2nd dose call 911. 25 tablet 1    rosuvastatin (CRESTOR) 20 MG tablet Take 1 tablet (20 mg) by mouth daily. 90 tablet 0    oxyCODONE-acetaminophen (PERCOCET) 5-325 MG per tablet Take 1-2 tablets by mouth every 6 hours as needed for moderate to severe pain (Patient not taking: Reported on 4/23/2025) 16 tablet 0       ALLERGIES   No Known Allergies    PAST MEDICAL HISTORY:  Past Medical History:   Diagnosis Date    Tobacco abuse 11/13/2012       PAST SURGICAL HISTORY:  Past Surgical History:   Procedure Laterality Date    CV CORONARY ANGIOGRAM N/A 1/23/2023    Procedure: Coronary Angiogram;  Surgeon: Taco Price MD;  Location: [facility] CARDIAC CATH LAB    CV INTRAVASULAR ULTRASOUND N/A 1/23/2023    Procedure: Intravascular Ultrasound;  Surgeon: Taco Price MD;  Location: [facility] CARDIAC CATH LAB    CV PCI STENT DRUG ELUTING N/A 1/23/2023    Procedure: Percutaneous Coronary Intervention Stent;  Surgeon: Taco Price MD;  Location: [facility] CARDIAC CATH LAB    ORTHOPEDIC SURGERY  1996    R shoulder       FAMILY HISTORY:  Family History   Problem Relation Age of Onset    Unknown/Adopted Mother     Unknown/Adopted Father        SOCIAL HISTORY:  Social History     Socioeconomic History    Marital status:      Spouse name: None    Number of children: None    Years of education: None    Highest education level: None   Tobacco Use    Smoking status: Every Day     Types: Cigarettes    Smokeless tobacco: Never    Tobacco comments:     3-4 cigs per day   Substance and Sexual Activity    Alcohol use: Yes     Comment: socially    Drug use: No    Sexual activity: Yes     Partners: Male   Other Topics Concern    Parent/sibling w/ CABG, MI or angioplasty before 65F 55M? Yes     Comment: father heart attack at  "49     Social Drivers of Health     Financial Resource Strain: Low Risk  (3/5/2024)    Received from [facility]    Financial Resource Strain     Difficulty of Paying Living Expenses: 3   Food Insecurity: No Food Insecurity (3/5/2024)    Received from [facility]    Food Insecurity     Do you worry your food will run out before you are able to buy more?: 1   Transportation Needs: No Transportation Needs (3/5/2024)    Received from [facility]    Transportation Needs     Does lack of transportation keep you from medical appointments?: 1     Does lack of transportation keep you from work, meetings or getting things that you need?: 1   Social Connections: Socially Integrated (3/5/2024)    Received from [facility]    Social Connections     Do you often feel lonely or isolated from those around you?: 0   Housing Stability: Low Risk  (3/5/2024)    Received from [facility]    Housing Stability     What is your housing situation today?: 1       Review of Systems:  Skin:        Eyes:       ENT:       Respiratory:  Negative    Cardiovascular:  Negative, palpitations, chest pain, syncope or near-syncope, cyanosis, lightheadedness, dizziness Positive for, fatigue  Gastroenterology:      Genitourinary:       Musculoskeletal:  Positive for back pain  Neurologic:       Psychiatric:  Positive for sleep disturbances, excessive stress  Heme/Lymph/Imm:       Endocrine:         Physical Exam:    Vitals: /79   Pulse 67   Ht 1.651 m (5' 5\")   Wt 47.8 kg (105 lb 6.4 oz)   BMI 17.54 kg/m    Constitutional: Well nourished and in no apparent distress.  Neck: Supple.   Respiratory: Breathing non-labored. Lungs clear to auscultation bilaterally. No crackles, wheezes, rhonchi, or rales.  Cardiovascular:  Regular rate and rhythm, normal S1 and S2. No murmur, rub, or " "gallop.  Skin: Warm, dry.   Extremities: No edema.  Neurologic: No gross motor deficits. Alert, awake, and oriented to person, place and time.  Psychiatric: Affect appropriate.        Recent Lab Results:  LIPID RESULTS:  Lab Results   Component Value Date    CHOL 149 04/21/2025    CHOL 209 (H) 06/05/2014    HDL 72 04/21/2025    HDL 47 (L) 06/05/2014    LDL 56 04/21/2025     06/05/2014    TRIG 104 04/21/2025    TRIG 175 (H) 06/05/2014    CHOLHDLRATIO 4.5 06/05/2014       LIVER ENZYME RESULTS:  Lab Results   Component Value Date    AST 32 01/20/2023    AST 39 06/05/2014    ALT 26 04/21/2025    ALT 25 06/05/2014       CBC RESULTS:  Lab Results   Component Value Date    WBC 6.8 01/23/2023    WBC 14.3 (H) 05/11/2016    RBC 4.43 01/23/2023    RBC 4.02 05/11/2016    HGB 14.0 01/23/2023    HGB 12.7 05/11/2016    HCT 42.0 01/23/2023    HCT 38.5 05/11/2016    MCV 95 01/23/2023    MCV 96 05/11/2016    MCH 31.6 01/23/2023    MCH 31.6 05/11/2016    MCHC 33.3 01/23/2023    MCHC 33.0 05/11/2016    RDW 12.2 01/23/2023    RDW 12.0 05/11/2016     01/23/2023     05/11/2016       BMP RESULTS:  Lab Results   Component Value Date     04/21/2025     05/11/2016    POTASSIUM 5.1 04/21/2025    POTASSIUM 3.6 05/11/2016    CHLORIDE 101 04/21/2025    CHLORIDE 101 05/11/2016    CO2 30 (H) 04/21/2025    CO2 30 05/11/2016    ANIONGAP 11 04/21/2025    ANIONGAP 5 05/11/2016     (H) 04/21/2025     (H) 05/11/2016    BUN 10.7 04/21/2025    BUN 9 05/11/2016    CR 0.68 04/21/2025    CR 0.63 05/11/2016    GFRESTIMATED >90 04/21/2025    GFRESTIMATED >90  Non  GFR Calc   05/11/2016    GFRESTBLACK >90   GFR Calc   05/11/2016    VETO 9.6 04/21/2025    VETO 9.1 05/11/2016        A1C RESULTS:  Lab Results   Component Value Date    A1C 5.4 01/20/2023       INR RESULTS:  No results found for: \"INR\"        RHONA Willams CNP  [address] [place] [address]                "

## 2025-04-23 NOTE — LETTER
4/23/2025    Marquis Wright MD  [address] [address]    RE: Linda Guadalupe       Dear Colleague,     I had the pleasure of seeing Linda Guadalupe in the [facility].          ~Cardiology Clinic Visit~    Primary Cardiologist: Jose  Reason for visit: Annual cardiology follow-up      Linda Guadalupe MRN# [id number]   YOB: 1960 Age: 65 year old       HPI:    Linda Guadalupe is a delightful 65 year old patient with past medical history significant for:    Coronary artery disease with history of anterior MI in 1/2023 s/p JOYCE x 1  Hypertension  Hyperlipidemia  Tobacco smoker    I had the opportunity to see Linda Guadalupe for cardiology follow up. In review, the patient has a history of NSTEMI in January 2023.  An angiogram at that time revealed vessel obstructive coronary disease, 80% proximal LAD gnosis.  Mild, nonobstructive coronary disease elsewhere.  She received a single proximal LAD.  An echocardiogram initially showed LVEF 40-45%.  A repeat echo in 5/2023 showed LVEF 55-60% with no wall motion abnormalities.    At her follow-up office visit with Dr. Garcia, she complained of significant bruising on Effient and was transition to clopidogrel.  Lisinopril was discontinued due to fatigue and now normalized ejection fraction.     When seen last by my colleague Aida Palacios CNP on 1/26/2024 she was feeling well from a cardiac standpoint and had no concerning anginal or heart failure symptoms.  Her blood pressure was well-controlled goal.  Unfortunately at that time she continued to smoke 4 cigarettes/day.  She was working with her primary care provider to quit smoking.      Since seen last, Linda overall feels well from a cardiac standpoint. She denies chest pain, pressure, or tightness. No shortness of breath or dyspnea on exertion. No lower extremity swelling, orthopnea, or PND. No lightheadedness, dizziness, or syncope. No  palpitations or racing heart. No blood in urine or stool. 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 Recent unintentional weight loss- historically weight has been very stable around 115 lbs, 105 lbs today.     Diagnotic studies:  Echocardiogram 5/31/2023:  1. Normal biventricular size and function. Left ventricular ejection fraction  of 55-60%.  2. No segmental wall motion abnormalities noted.  3. There is mild (1+) tricuspid regurgitation.  Compared to the previous echocardiogram on 1/21/2023. LV systolic funciton is  normal today. WMA are no longer present.  Coronary angiogram 1/23/2023:  1.  Single-vessel obstructive CAD (80% proximal LAD stenosis)  2.  Mild, nonobstructive CAD elsewhere   3.  Successful HD IVUS guided PCI of the proximal LAD with placement of a single 3.0 x 20 mm Synergy JOYCE, postdilated with a 3.5 mm NC balloon      Assessment:  Coronary artery disease with history of anterior MI in 1/2023 s/p JOYCE x 1  No anginal symptoms  Aspirin 81 mg, rosuvastatin 20 mg  PRN sl nitroglycerin  Hypertension  Well controlled with metoprolol succinate 25 mg daily  Hyperlipidemia  Lipid panel 4/21/2025: cholesterol 147, HDL, 72, LDL 56,   Rosuvastatin 20 mg  Tobacco smoker  Unintentional weight loss      Plan:   It was my pleasure to see Linda for cardiology follow up. Overall, she is doing quite well and has no concerning anginal or heart failure symptoms. We reviewed her recent lab work which shows stable kidney function and cholesterol at goal. Continue rosuvastatin 20 mg. Repeat FLP/ALT next year.   Blood pressure well controlled, continue metoprolol 25 mg daily    We discussed optimizing heart health through regular aerobic exercise, Mediterranean diet, and smoking cessation.   Encouraged to follow up with PCP regarding recent unintentional weight loss  Follow up with Dr. Garcia in 1 year,  sooner if any new concerns     Thank you for the opportunity to participate in this pleasant patient's care.    We would be happy to see this patient sooner for any concerns in the meantime.    RHONA Mckee, CNP   Nurse Practitioner  [facility]    A total of 33 minutes was spent with patient, on chart review and documentation today.         Orders Placed This Encounter   Procedures     Basic metabolic panel     Lipid Profile     ALT     Follow-Up with Cardiology     Orders Placed This Encounter   Medications     metoprolol succinate ER (TOPROL XL) 25 MG 24 hr tablet     Sig: Take 1 tablet (25 mg) by mouth daily.     Dispense:  90 tablet     Refill:  0     nitroGLYcerin (NITROSTAT) 0.4 MG sublingual tablet     Sig: For chest pain place 1 tablet under the tongue every 5 minutes for 3 doses. If symptoms persist 5 minutes after 2nd dose call 911.     Dispense:  25 tablet     Refill:  1     rosuvastatin (CRESTOR) 20 MG tablet     Sig: Take 1 tablet (20 mg) by mouth daily.     Dispense:  90 tablet     Refill:  0     aspirin 81 MG EC tablet     Sig: Take 1 tablet (81 mg) by mouth daily.     Dispense:  90 tablet     Refill:  0     Medications Discontinued During This Encounter   Medication Reason     nitroGLYcerin (NITROSTAT) 0.4 MG sublingual tablet Reorder (No AVS)     metoprolol succinate ER (TOPROL XL) 25 MG 24 hr tablet Reorder (No AVS)     rosuvastatin (CRESTOR) 20 MG tablet Reorder (No AVS)     aspirin 81 MG EC tablet Reorder (No AVS)         Encounter Diagnoses   Name Primary?     NSTEMI (non-ST elevated myocardial infarction) (H)      Ischemic cardiomyopathy      Coronary artery disease involving native coronary artery of native heart without angina pectoris Yes     Hyperlipidemia with target LDL less than 70        CURRENT MEDICATIONS:  Current Outpatient Medications   Medication Sig Dispense Refill     albuterol (PROAIR HFA/PROVENTIL HFA/VENTOLIN HFA) 108 (90 Base) MCG/ACT inhaler  Inhale 1-2 puffs into the lungs every 4 hours as needed for shortness of breath or wheezing       aspirin 81 MG EC tablet Take 1 tablet (81 mg) by mouth daily. 90 tablet 0     metoprolol succinate ER (TOPROL XL) 25 MG 24 hr tablet Take 1 tablet (25 mg) by mouth daily. 90 tablet 0     nitroGLYcerin (NITROSTAT) 0.4 MG sublingual tablet For chest pain place 1 tablet under the tongue every 5 minutes for 3 doses. If symptoms persist 5 minutes after 2nd dose call 911. 25 tablet 1     rosuvastatin (CRESTOR) 20 MG tablet Take 1 tablet (20 mg) by mouth daily. 90 tablet 0     oxyCODONE-acetaminophen (PERCOCET) 5-325 MG per tablet Take 1-2 tablets by mouth every 6 hours as needed for moderate to severe pain (Patient not taking: Reported on 4/23/2025) 16 tablet 0       ALLERGIES   No Known Allergies    PAST MEDICAL HISTORY:  Past Medical History:   Diagnosis Date     Tobacco abuse 11/13/2012       PAST SURGICAL HISTORY:  Past Surgical History:   Procedure Laterality Date     CV CORONARY ANGIOGRAM N/A 1/23/2023    Procedure: Coronary Angiogram;  Surgeon: Taco Price MD;  Location: [facility] CARDIAC CATH LAB     CV INTRAVASULAR ULTRASOUND N/A 1/23/2023    Procedure: Intravascular Ultrasound;  Surgeon: Taco Price MD;  Location: [facility] CARDIAC CATH LAB     CV PCI STENT DRUG ELUTING N/A 1/23/2023    Procedure: Percutaneous Coronary Intervention Stent;  Surgeon: Taco Price MD;  Location: [facility] CARDIAC CATH LAB     ORTHOPEDIC SURGERY  1996    R shoulder       FAMILY HISTORY:  Family History   Problem Relation Age of Onset     Unknown/Adopted Mother      Unknown/Adopted Father        SOCIAL HISTORY:  Social History     Socioeconomic History     Marital status:      Spouse name: None     Number of children: None     Years of education: None     Highest education level: None   Tobacco Use     Smoking status: Every Day     Types: Cigarettes     Smokeless tobacco: Never     Tobacco  "comments:     3-4 cigs per day   Substance and Sexual Activity     Alcohol use: Yes     Comment: socially     Drug use: No     Sexual activity: Yes     Partners: Male   Other Topics Concern     Parent/sibling w/ CABG, MI or angioplasty before 65F 55M? Yes     Comment: father heart attack at 49     Social Drivers of Health     Financial Resource Strain: Low Risk  (3/5/2024)    [facility]    Financial Resource Strain      Difficulty of Paying Living Expenses: 3   Food Insecurity: No Food Insecurity (3/5/2024)    Received from [employer] [facility]    Food Insecurity      Do you worry your food will run out before you are able to buy more?: 1   Transportation Needs: No Transportation Needs (3/5/2024)    [facility]    Transportation Needs      Does lack of transportation keep you from medical appointments?: 1      Does lack of transportation keep you from work, meetings or getting things that you need?: 1   Social Connections: Socially Integrated (3/5/2024)    [facility]    Social Connections      Do you often feel lonely or isolated from those around you?: 0   Housing Stability: Low Risk  (3/5/2024)    Received from [facility]    Housing Stability      What is your housing situation today?: 1       Review of Systems:  Skin:        Eyes:       ENT:       Respiratory:  Negative    Cardiovascular:  Negative, palpitations, chest pain, syncope or near-syncope, cyanosis, lightheadedness, dizziness Positive for, fatigue  Gastroenterology:      Genitourinary:       Musculoskeletal:  Positive for back pain  Neurologic:       Psychiatric:  Positive for sleep disturbances, excessive stress  Heme/Lymph/Imm:       Endocrine:         Physical Exam:    Vitals: /79   Pulse 67   Ht 1.651 m (5' 5\")   Wt 47.8 kg (105 lb 6.4 oz)   " BMI 17.54 kg/m    Constitutional: Well nourished and in no apparent distress.  Neck: Supple.   Respiratory: Breathing non-labored. Lungs clear to auscultation bilaterally. No crackles, wheezes, rhonchi, or rales.  Cardiovascular:  Regular rate and rhythm, normal S1 and S2. No murmur, rub, or gallop.  Skin: Warm, dry.   Extremities: No edema.  Neurologic: No gross motor deficits. Alert, awake, and oriented to person, place and time.  Psychiatric: Affect appropriate.        Recent Lab Results:  LIPID RESULTS:  Lab Results   Component Value Date    CHOL 149 04/21/2025    CHOL 209 (H) 06/05/2014    HDL 72 04/21/2025    HDL 47 (L) 06/05/2014    LDL 56 04/21/2025     06/05/2014    TRIG 104 04/21/2025    TRIG 175 (H) 06/05/2014    CHOLHDLRATIO 4.5 06/05/2014       LIVER ENZYME RESULTS:  Lab Results   Component Value Date    AST 32 01/20/2023    AST 39 06/05/2014    ALT 26 04/21/2025    ALT 25 06/05/2014       CBC RESULTS:  Lab Results   Component Value Date    WBC 6.8 01/23/2023    WBC 14.3 (H) 05/11/2016    RBC 4.43 01/23/2023    RBC 4.02 05/11/2016    HGB 14.0 01/23/2023    HGB 12.7 05/11/2016    HCT 42.0 01/23/2023    HCT 38.5 05/11/2016    MCV 95 01/23/2023    MCV 96 05/11/2016    MCH 31.6 01/23/2023    MCH 31.6 05/11/2016    MCHC 33.3 01/23/2023    MCHC 33.0 05/11/2016    RDW 12.2 01/23/2023    RDW 12.0 05/11/2016     01/23/2023     05/11/2016       BMP RESULTS:  Lab Results   Component Value Date     04/21/2025     05/11/2016    POTASSIUM 5.1 04/21/2025    POTASSIUM 3.6 05/11/2016    CHLORIDE 101 04/21/2025    CHLORIDE 101 05/11/2016    CO2 30 (H) 04/21/2025    CO2 30 05/11/2016    ANIONGAP 11 04/21/2025    ANIONGAP 5 05/11/2016     (H) 04/21/2025     (H) 05/11/2016    BUN 10.7 04/21/2025    BUN 9 05/11/2016    CR 0.68 04/21/2025    CR 0.63 05/11/2016    GFRESTIMATED >90 04/21/2025    GFRESTIMATED >90  Non  GFR Calc   05/11/2016    GFRESTBLACK  ">90   GFR Calc   05/11/2016    VETO 9.6 04/21/2025    VETO 9.1 05/11/2016        A1C RESULTS:  Lab Results   Component Value Date    A1C 5.4 01/20/2023       INR RESULTS:  No results found for: \"INR\"        CC  RHONA Sanchez CNP  [address] [place] [address]                  Thank you for allowing me to participate in the care of your patient.      Sincerely,     RHONA Mckee CNP     [facility]  cc:   RHONA Sanchez CNP  [address] [place] [address]      "

## 2025-04-23 NOTE — PATIENT INSTRUCTIONS
Thank you for your visit with the [facility] today.    Today's Summary:    No changes to medications today- your cholesterol and blood pressure look great  Continue to stay active like you have!  Mediterranean diet  Great job on cutting back on the cigarettes- continue to work toward cutting completely  Follow up with PCP to discuss recent weight loss    Follow-up:  Cardiology follow up at [facility]: 1 year with Dr. Garcia .     Cardiology Scheduling ~[phone]  Cardiology Clinic RN ~ [phone]    It was a pleasure seeing you today.     RHONA Mckee, CNP  Certified Nurse Practitioner  [facility]  April 23, 2025  ________________________________________________________

## 2025-07-06 ENCOUNTER — MYC REFILL (OUTPATIENT)
Dept: CARDIOLOGY | Facility: CLINIC | Age: 65
End: 2025-07-06
Payer: COMMERCIAL

## 2025-07-06 DIAGNOSIS — I25.5 ISCHEMIC CARDIOMYOPATHY: ICD-10-CM

## 2025-07-06 DIAGNOSIS — I21.4 NSTEMI (NON-ST ELEVATED MYOCARDIAL INFARCTION) (H): ICD-10-CM

## 2025-07-07 RX ORDER — ASPIRIN 81 MG/1
81 TABLET ORAL DAILY
Qty: 90 TABLET | Refills: 3 | Status: SHIPPED | OUTPATIENT
Start: 2025-07-07

## 2025-07-07 RX ORDER — METOPROLOL SUCCINATE 25 MG/1
25 TABLET, EXTENDED RELEASE ORAL DAILY
Qty: 90 TABLET | Refills: 3 | Status: SHIPPED | OUTPATIENT
Start: 2025-07-07

## 2025-07-07 RX ORDER — ROSUVASTATIN CALCIUM 20 MG/1
20 TABLET, COATED ORAL DAILY
Qty: 90 TABLET | Refills: 3 | Status: SHIPPED | OUTPATIENT
Start: 2025-07-07

## 2025-08-17 ENCOUNTER — HEALTH MAINTENANCE LETTER (OUTPATIENT)
Age: 65
End: 2025-08-17

## (undated) DEVICE — TOTE ANGIO CORP PC15AT SAN32CC83O

## (undated) DEVICE — CATH GUIDING BLUE YELLOW PTFE XB3 6FRX100CM 67005200

## (undated) DEVICE — GUIDEWIRE VASC 0.014INX180CM RUNTHROUGH 25-1011

## (undated) DEVICE — CATH BALLOON NC EMERGE 3.50X12MM H7493926712350

## (undated) DEVICE — MANIFOLD KIT ANGIO AUTOMATED 014613

## (undated) DEVICE — KIT HAND CONTROL ANGIOTOUCH ACIST 65CM AT-P65

## (undated) DEVICE — CATH BALLOON NC EUPHORA 3.00X15MM NCEUP3015X

## (undated) DEVICE — DEFIB PRO-PADZ LVP LQD GEL ADULT 8900-2105-01

## (undated) DEVICE — INTRO GLIDESHEATH SLENDER 6FR 10X45CM 60-1060

## (undated) DEVICE — CATH IVUS OPTICROSS HD 6 3.6FR 1.18MM DIA 135CML H7493935408

## (undated) DEVICE — SLEEVE TR BAND RADIAL COMPRESSION DEVICE 24CM TRB24-REG

## (undated) DEVICE — GLIDEWIRE TERUMO .035X180CM 1.5,, J-TIP GR3525

## (undated) DEVICE — CATH DIAGNOSTIC RADIAL 5FR TIG 4.0

## (undated) DEVICE — INFL DVC BASIXCOMPAK PLYCRB 30 ATM 13IN 20ML IN4530

## (undated) DEVICE — WIRE GUIDE 0.035"X260CM SAFE-T-J EXCHANGE G00517

## (undated) RX ORDER — VERAPAMIL HYDROCHLORIDE 2.5 MG/ML
INJECTION, SOLUTION INTRAVENOUS
Status: DISPENSED
Start: 2023-01-23

## (undated) RX ORDER — PRASUGREL 10 MG/1
TABLET, FILM COATED ORAL
Status: DISPENSED
Start: 2023-01-23

## (undated) RX ORDER — NITROGLYCERIN 5 MG/ML
VIAL (ML) INTRAVENOUS
Status: DISPENSED
Start: 2023-01-23

## (undated) RX ORDER — FENTANYL CITRATE 50 UG/ML
INJECTION, SOLUTION INTRAMUSCULAR; INTRAVENOUS
Status: DISPENSED
Start: 2023-01-23

## (undated) RX ORDER — HEPARIN SODIUM 1000 [USP'U]/ML
INJECTION, SOLUTION INTRAVENOUS; SUBCUTANEOUS
Status: DISPENSED
Start: 2023-01-23

## (undated) RX ORDER — HEPARIN SODIUM 200 [USP'U]/100ML
INJECTION, SOLUTION INTRAVENOUS
Status: DISPENSED
Start: 2023-01-23

## (undated) RX ORDER — LIDOCAINE HYDROCHLORIDE 10 MG/ML
INJECTION, SOLUTION EPIDURAL; INFILTRATION; INTRACAUDAL; PERINEURAL
Status: DISPENSED
Start: 2023-01-23